# Patient Record
Sex: MALE | Race: WHITE | NOT HISPANIC OR LATINO | Employment: OTHER | URBAN - METROPOLITAN AREA
[De-identification: names, ages, dates, MRNs, and addresses within clinical notes are randomized per-mention and may not be internally consistent; named-entity substitution may affect disease eponyms.]

---

## 2019-04-15 ENCOUNTER — OFFICE VISIT (OUTPATIENT)
Dept: CARDIOLOGY | Facility: CLINIC | Age: 79
End: 2019-04-15
Payer: MEDICARE

## 2019-04-15 VITALS
HEIGHT: 74 IN | BODY MASS INDEX: 27.08 KG/M2 | WEIGHT: 211 LBS | DIASTOLIC BLOOD PRESSURE: 55 MMHG | SYSTOLIC BLOOD PRESSURE: 110 MMHG | HEART RATE: 40 BPM

## 2019-04-15 DIAGNOSIS — I10 ESSENTIAL HYPERTENSION, BENIGN: ICD-10-CM

## 2019-04-15 DIAGNOSIS — I25.10 ASCVD (ARTERIOSCLEROTIC CARDIOVASCULAR DISEASE): Primary | ICD-10-CM

## 2019-04-15 DIAGNOSIS — Z95.5 S/P CORONARY ARTERY STENT PLACEMENT: ICD-10-CM

## 2019-04-15 DIAGNOSIS — E78.5 HYPERLIPIDEMIA LDL GOAL <70: ICD-10-CM

## 2019-04-15 PROCEDURE — 99204 OFFICE O/P NEW MOD 45 MIN: CPT | Performed by: INTERNAL MEDICINE

## 2019-04-15 PROCEDURE — 93000 ELECTROCARDIOGRAM COMPLETE: CPT | Performed by: INTERNAL MEDICINE

## 2019-04-15 RX ORDER — AMLODIPINE BESYLATE 5 MG/1
5 TABLET ORAL DAILY
COMMUNITY
Start: 2019-01-07

## 2019-04-15 RX ORDER — MAGNESIUM OXIDE 400 MG/1
400 TABLET ORAL DAILY
COMMUNITY
Start: 2018-10-08

## 2019-04-15 RX ORDER — ASPIRIN 81 MG/1
81 TABLET, CHEWABLE ORAL DAILY
COMMUNITY
Start: 2018-10-08

## 2019-04-15 RX ORDER — CARVEDILOL 3.12 MG/1
3.12 TABLET ORAL 2 TIMES DAILY
COMMUNITY
Start: 2018-10-08

## 2019-04-15 RX ORDER — ATORVASTATIN CALCIUM 10 MG/1
10 TABLET, FILM COATED ORAL DAILY
COMMUNITY

## 2019-04-15 RX ORDER — LOSARTAN POTASSIUM 50 MG/1
50 TABLET ORAL DAILY
COMMUNITY

## 2019-04-15 ASSESSMENT — MIFFLIN-ST. JEOR: SCORE: 1756.84

## 2019-04-15 NOTE — PROGRESS NOTES
HPI and Plan:   This nice 78-year-old gentleman is here, along with his wife, to establish cardiovascular follow-up.  About 7 years ago he moved to [place] and is now moving back here.  Prior to that in 2009 he had an acute coronary syndrome and had stenting of a complex LAD lesion with a drug-eluting stent.    He had done well after that but in 2015 in [place] started having really bad heartburn and eventually presented to the hospital where he states it was a total occlusion of 1 of his arteries that was successfully stented and did not not have a significant heart attack.  Those symptoms were different than his symptoms in 2009 and I wonder if that was a right coronary lesion.  He is going to get that cath report so that we can see it.    Since 2015 he has not had further symptoms.  He generally exercises pretty regularly and has had no limitations with that.  Since he just is moving back here he has been exercising less but is in the gym 2 or 3 days a week right now.  He is not having any exercise related limitations or symptoms.  Denies chest discomfort, exertional heartburn, palpitations dizziness or syncope, orthopnea PND or edema.  Gets some mild edema towards the end of the day which goes away at night, this has not changed and is not new.  Denies myalgias or muscle weakness, focal neurologic symptoms, claudication.      I reviewed recent outside labs in care everywhere in February his lipids were under appropriate levels with LDL 59, total cholesterol 114, triglycerides 101.  Electrolytes are normal.  Creatinine was elevated at 1.32 which is up from typically around 1.0 in 2012 here.  Hemoglobin was 13.5, hemoglobin A1c elevated at 7%.      Data review indicates a fairly good Mediterranean type diet and I reviewed the details of that with him again today.  He was able to lose quite a bit of weight up until last year and that was more sedentary with all of his moves recently and is gained some  weight back but he is still 10 pounds or more down from 2012.  He would like to get back down to 190-195 where he was previously and states he will go back to that regimen.    Exam detailed below.  Blood pressure controlled.  Heart rate 60.  Cardiac, pulmonary, peripheral vascular exams normal.    EKG today demonstrates sinus rhythm with occasional PACs.  There are no Q waves and it is otherwise within normal limits.    Impression/plan    1-coronary artery disease.  Currently without ischemic heart failure or arrhythmia symptoms.  Risk factor intervention going fairly well.  Ramping back up on exercise and weight loss will help and I think he will be able to do this.  That may help his diabetes control also.    2-history of drug-eluting stents.  He will try to get the 2015 catheter report for us so we can do further details.    3.-Dyslipidemia, at goal.    4-hypertension controlled.    It was nice to see this gentleman doing well.  At this point in time his medication regimen is appropriate and should be continued.  Lifestyle intervention ramp up recommended.  Otherwise as long as he remains stable I recommended follow-up in 1 year.      Orders Placed This Encounter   Procedures     Follow-Up with Cardiac Advanced Practice Provider     EKG 12-lead complete w/read - Clinics (performed today)       Orders Placed This Encounter   Medications     atorvastatin (LIPITOR) 10 MG tablet     Sig: Take 10 mg by mouth daily     losartan (COZAAR) 50 MG tablet     Sig: Take 50 mg by mouth daily     carvedilol (COREG) 3.125 MG tablet     Sig: Take 3.125 mg by mouth 2 times daily     magnesium oxide (MAG-OX) 400 MG tablet     Sig: Take 400 mg by mouth daily     aspirin (ASA) 81 MG chewable tablet     Sig: Take 81 mg by mouth daily     insulin glargine U-300 (TOUJEO MAX SOLOSTAR) 300 UNIT/ML injection     Sig: Inject 24 mg Subcutaneous At Bedtime     amLODIPine (NORVASC) 5 MG tablet     Sig: Take 5 mg by mouth daily        Medications Discontinued During This Encounter   Medication Reason     simvastatin (ZOCOR) 40 MG tablet Medication Reconciliation Clean Up     losartan (COZAAR) 100 MG tablet Medication Reconciliation Clean Up     ASPIRIN 325 MG OR TBEC Medication Reconciliation Clean Up     doxycycline (VIBRA-TABS) 100 MG tablet Medication Reconciliation Clean Up     FLUoxetine (PROZAC) 20 MG capsule Medication Reconciliation Clean Up     glyBURIDE (DIABETA / MICRONASE) 5 MG tablet Medication Reconciliation Clean Up     hydrochlorothiazide (HYDRODIURIL) 25 MG tablet Medication Reconciliation Clean Up     LANTUS VIAL 100 UNITS/ML SC SOLN Medication Reconciliation Clean Up     sitagliptan (JANUVIA) 100 MG tablet Discontinued by another Health Care Provider     VITAMIN B-12 50 MCG OR TABS Medication Reconciliation Clean Up         Encounter Diagnoses   Name Primary?     ASCVD (arteriosclerotic cardiovascular disease) Yes     Hyperlipidemia LDL goal <70      S/P coronary artery stent placement      Essential hypertension, benign        CURRENT MEDICATIONS:  Current Outpatient Medications   Medication Sig Dispense Refill     amLODIPine (NORVASC) 5 MG tablet Take 5 mg by mouth daily       aspirin (ASA) 81 MG chewable tablet Take 81 mg by mouth daily       atorvastatin (LIPITOR) 10 MG tablet Take 10 mg by mouth daily       carvedilol (COREG) 3.125 MG tablet Take 3.125 mg by mouth 2 times daily       insulin glargine U-300 (TOUJEO MAX SOLOSTAR) 300 UNIT/ML injection Inject 24 mg Subcutaneous At Bedtime       losartan (COZAAR) 50 MG tablet Take 50 mg by mouth daily       magnesium oxide (MAG-OX) 400 MG tablet Take 400 mg by mouth daily       metFORMIN (GLUCOPHAGE) 1000 MG tablet Take 1 tablet by mouth 2 times daily (with meals). 180 tablet 0     BD ULTRA FINE PEN NEEDLES 31g 5 mm using injecting once per day 100 Units 1     glucose blood VI test strips (ONE TOUCH ULTRA TEST) strip by In Vitro route. 100 strip 4     insulin detemir  (LEVEMIR FLEXPEN) 100 UNIT/ML injection Inject 40 Units Subcutaneous daily. (Patient not taking: Reported on 4/15/2019) 3 Month 0     INSULIN PEN NEEDLE MISC Insulin pen needle caps- in injection daily 100 Each 3     nitroGLYCERIN (NITROSTAT) 0.4 MG SL tablet Take 1 tablet by mouth every 5 minutes as needed. up to 3 tablets per episode. (Patient not taking: Reported on 4/15/2019) 90 tablet 0       ALLERGIES     Allergies   Allergen Reactions     Lisinopril Swelling     angioedema       PAST MEDICAL HISTORY:  Past Medical History:   Diagnosis Date     ASCVD (arteriosclerotic cardiovascular disease) 5/09    NSTMI; PTCA of LAD     Degeneration of lumbar or lumbosacral intervertebral disc      Depression 2000     Essential hypertension, benign      Gout      Other and unspecified hyperlipidemia      Thoracic or lumbosacral neuritis or radiculitis, unspecified      Type II or unspecified type diabetes mellitus without mention of complication, not stated as uncontrolled 1997       PAST SURGICAL HISTORY:  Past Surgical History:   Procedure Laterality Date     ANGIOPLASTY  5/11/09    PTCA of LAD at [facility]     APPENDECTOMY  2003    abscessed appendix     C INJECT EPIDURAL ANEST,LUMBAR,SINGLE  7/27/07    Right L5-S1 translaminar epidural under fluoroscopy with corticosteroid     C LUMBAR SPINE FUSN,POST TECH  2/19/08    Bilateral L4-5 decompression, Posterolateral fusion L4-5 with pedicular fixation and local autograft     HC COLONOSCOPY THRU STOMA, DIAGNOSTIC  2002    no polyps, no diverticuli     HC COLONOSCOPY THRU STOMA, DIAGNOSTIC  11/02/07    Normal colonoscopy at [facility]     HC REPAIR ING HERNIA,5+Y/O,REDUCIBL  1960's    bilateral inguinal hernia repair     LAPAROSCOPIC HERNIORRHAPHY VENTRAL  11/12/10    Laparoscopic ventral hernia repair with Proceed patch       FAMILY HISTORY:  Family History   Problem Relation Age of Onset     C.A.D. Father      Diabetes Father      Family History Negative Mother      Blood Disease  Sister          acute leukemia age 56     Family History Negative Sister      Family History Negative Sister        SOCIAL HISTORY:  Social History     Socioeconomic History     Marital status:      Spouse name: None     Number of children: None     Years of education: None     Highest education level: None   Occupational History     None   Social Needs     Financial resource strain: None     Food insecurity:     Worry: None     Inability: None     Transportation needs:     Medical: None     Non-medical: None   Tobacco Use     Smoking status: Former Smoker     Types: Cigars     Smokeless tobacco: Former User     Quit date: 2012     Tobacco comment: former 3 cigars per week (socially)   Substance and Sexual Activity     Alcohol use: Yes     Comment: occasionally     Drug use: No     Sexual activity: Yes     Partners: Female   Lifestyle     Physical activity:     Days per week: None     Minutes per session: None     Stress: None   Relationships     Social connections:     Talks on phone: None     Gets together: None     Attends Nond[employer] service: None     Active member of club or organization: None     Attends meetings of clubs or organizations: None     Relationship status: None     Intimate partner violence:     Fear of current or ex partner: None     Emotionally abused: None     Physically abused: None     Forced sexual activity: None   Other Topics Concern     Parent/sibling w/ CABG, MI or angioplasty before 65F 55M? Not Asked   Social History Narrative     None       Review of Systems:  Skin:  Positive for bruising had melanomas removed   Eyes:  Negative      ENT:  Negative      Respiratory:  Negative       Cardiovascular:    edema;Positive for;fatigue edema in feet  Gastroenterology: Negative      Genitourinary:  not assessed      Musculoskeletal:  Positive for joint pain occ  Neurologic:  Negative      Psychiatric:  Positive for sleep disturbances    Heme/Lymph/Imm:  Positive for allergies   "  Endocrine:  Positive for diabetes      Physical Exam:  Vitals: /55   Pulse (!) 40   Ht 1.88 m (6' 2\")   Wt 95.7 kg (211 lb)   BMI 27.09 kg/m      Constitutional:  cooperative, alert and oriented, well developed, well nourished, in no acute distress        Skin:  warm and dry to the touch, no apparent skin lesions or masses noted          Head:  normocephalic, no masses or lesions        Eyes:  pupils equal and round;sclera white;EOMS intact        Lymph:      ENT:  no pallor or cyanosis, dentition good        Neck:  carotid pulses are full and equal bilaterally, JVP normal, no carotid bruit        Respiratory:  normal breath sounds, clear to auscultation, normal A-P diameter, normal symmetry, normal respiratory excursion, no use of accessory muscles         Cardiac: regular rhythm, normal S1/S2, no S3 or S4, apical impulse not displaced, no murmurs, gallops or rubs                pulses full and equal, no bruits auscultated                                        GI:  abdomen soft;no HSM;non-tender;no bruits        Extremities and Muscular Skeletal:  no deformities, clubbing, cyanosis, erythema observed       LLE edema;trace      Neurological:  no gross motor deficits        Psych:  affect appropriate, oriented to time, person and place        CC  Mehrdad Cortez MD  [address] [place] [address]              "

## 2019-04-15 NOTE — LETTER
4/15/2019    Bon Su MD  [facility] [address]  [facility] [address]    RE: Hilario Herrera       Dear Colleague,    I had the pleasure of seeing Hilario Herrera in the [facility].    HPI and Plan:   This nice 78-year-old gentleman is here, along with his wife, to establish cardiovascular follow-up.  About 7 years ago he moved to [place] and is now moving back here.  Prior to that in 2009 he had an acute coronary syndrome and had stenting of a complex LAD lesion with a drug-eluting stent.    He had done well after that but in 2015 in [place] started having really bad heartburn and eventually presented to the hospital where he states it was a total occlusion of 1 of his arteries that was successfully stented and did not not have a significant heart attack.  Those symptoms were different than his symptoms in 2009 and I wonder if that was a right coronary lesion.  He is going to get that cath report so that we can see it.    Since 2015 he has not had further symptoms.  He generally exercises pretty regularly and has had no limitations with that.  Since he just is moving back here he has been exercising less but is in the gym 2 or 3 days a week right now.  He is not having any exercise related limitations or symptoms.  Denies chest discomfort, exertional heartburn, palpitations dizziness or syncope, orthopnea PND or edema.  Gets some mild edema towards the end of the day which goes away at night, this has not changed and is not new.  Denies myalgias or muscle weakness, focal neurologic symptoms, claudication.      I reviewed recent outside labs in care everywhere in February his lipids were under appropriate levels with LDL 59, total cholesterol 114, triglycerides 101.  Electrolytes are normal.  Creatinine was elevated at 1.32 which is up from typically around 1.0 in 2012 here.  Hemoglobin was 13.5, hemoglobin A1c elevated at 7%.      Data review  indicates a fairly good Mediterranean type diet and I reviewed the details of that with him again today.  He was able to lose quite a bit of weight up until last year and that was more sedentary with all of his moves recently and is gained some weight back but he is still 10 pounds or more down from 2012.  He would like to get back down to 190-195 where he was previously and states he will go back to that regimen.    Exam detailed below.  Blood pressure controlled.  Heart rate 60.  Cardiac, pulmonary, peripheral vascular exams normal.    EKG today demonstrates sinus rhythm with occasional PACs.  There are no Q waves and it is otherwise within normal limits.    Impression/plan    1-coronary artery disease.  Currently without ischemic heart failure or arrhythmia symptoms.  Risk factor intervention going fairly well.  Ramping back up on exercise and weight loss will help and I think he will be able to do this.  That may help his diabetes control also.    2-history of drug-eluting stents.  He will try to get the 2015 catheter report for us so we can do further details.    3.-Dyslipidemia, at goal.    4-hypertension controlled.    It was nice to see this gentleman doing well.  At this point in time his medication regimen is appropriate and should be continued.  Lifestyle intervention ramp up recommended.  Otherwise as long as he remains stable I recommended follow-up in 1 year.      Orders Placed This Encounter   Procedures     Follow-Up with Cardiac Advanced Practice Provider     EKG 12-lead complete w/read - Clinics (performed today)       Orders Placed This Encounter   Medications     atorvastatin (LIPITOR) 10 MG tablet     Sig: Take 10 mg by mouth daily     losartan (COZAAR) 50 MG tablet     Sig: Take 50 mg by mouth daily     carvedilol (COREG) 3.125 MG tablet     Sig: Take 3.125 mg by mouth 2 times daily     magnesium oxide (MAG-OX) 400 MG tablet     Sig: Take 400 mg by mouth daily     aspirin (ASA) 81 MG chewable  tablet     Sig: Take 81 mg by mouth daily     insulin glargine U-300 (TOUJEO MAX SOLOSTAR) 300 UNIT/ML injection     Sig: Inject 24 mg Subcutaneous At Bedtime     amLODIPine (NORVASC) 5 MG tablet     Sig: Take 5 mg by mouth daily       Medications Discontinued During This Encounter   Medication Reason     simvastatin (ZOCOR) 40 MG tablet Medication Reconciliation Clean Up     losartan (COZAAR) 100 MG tablet Medication Reconciliation Clean Up     ASPIRIN 325 MG OR TBEC Medication Reconciliation Clean Up     doxycycline (VIBRA-TABS) 100 MG tablet Medication Reconciliation Clean Up     FLUoxetine (PROZAC) 20 MG capsule Medication Reconciliation Clean Up     glyBURIDE (DIABETA / MICRONASE) 5 MG tablet Medication Reconciliation Clean Up     hydrochlorothiazide (HYDRODIURIL) 25 MG tablet Medication Reconciliation Clean Up     LANTUS VIAL 100 UNITS/ML SC SOLN Medication Reconciliation Clean Up     sitagliptan (JANUVIA) 100 MG tablet Discontinued by another Health Care Provider     VITAMIN B-12 50 MCG OR TABS Medication Reconciliation Clean Up         Encounter Diagnoses   Name Primary?     ASCVD (arteriosclerotic cardiovascular disease) Yes     Hyperlipidemia LDL goal <70      S/P coronary artery stent placement      Essential hypertension, benign        CURRENT MEDICATIONS:  Current Outpatient Medications   Medication Sig Dispense Refill     amLODIPine (NORVASC) 5 MG tablet Take 5 mg by mouth daily       aspirin (ASA) 81 MG chewable tablet Take 81 mg by mouth daily       atorvastatin (LIPITOR) 10 MG tablet Take 10 mg by mouth daily       carvedilol (COREG) 3.125 MG tablet Take 3.125 mg by mouth 2 times daily       insulin glargine U-300 (TOUJEO MAX SOLOSTAR) 300 UNIT/ML injection Inject 24 mg Subcutaneous At Bedtime       losartan (COZAAR) 50 MG tablet Take 50 mg by mouth daily       magnesium oxide (MAG-OX) 400 MG tablet Take 400 mg by mouth daily       metFORMIN (GLUCOPHAGE) 1000 MG tablet Take 1 tablet by mouth 2 times  daily (with meals). 180 tablet 0     BD ULTRA FINE PEN NEEDLES 31g 5 mm using injecting once per day 100 Units 1     glucose blood VI test strips (ONE TOUCH ULTRA TEST) strip by In Vitro route. 100 strip 4     insulin detemir (LEVEMIR FLEXPEN) 100 UNIT/ML injection Inject 40 Units Subcutaneous daily. (Patient not taking: Reported on 4/15/2019) 3 Month 0     INSULIN PEN NEEDLE MISC Insulin pen needle caps- in injection daily 100 Each 3     nitroGLYCERIN (NITROSTAT) 0.4 MG SL tablet Take 1 tablet by mouth every 5 minutes as needed. up to 3 tablets per episode. (Patient not taking: Reported on 4/15/2019) 90 tablet 0       ALLERGIES     Allergies   Allergen Reactions     Lisinopril Swelling     angioedema       PAST MEDICAL HISTORY:  Past Medical History:   Diagnosis Date     ASCVD (arteriosclerotic cardiovascular disease) 5/09    NSTMI; PTCA of LAD     Degeneration of lumbar or lumbosacral intervertebral disc      Depression 2000     Essential hypertension, benign      Gout      Other and unspecified hyperlipidemia      Thoracic or lumbosacral neuritis or radiculitis, unspecified      Type II or unspecified type diabetes mellitus without mention of complication, not stated as uncontrolled 1997       PAST SURGICAL HISTORY:  Past Surgical History:   Procedure Laterality Date     ANGIOPLASTY  5/11/09    PTCA of LAD at [facility]     APPENDECTOMY  2003    abscessed appendix     C INJECT EPIDURAL ANEST,LUMBAR,SINGLE  7/27/07    Right L5-S1 translaminar epidural under fluoroscopy with corticosteroid     C LUMBAR SPINE FUSN,POST TECH  2/19/08    Bilateral L4-5 decompression, Posterolateral fusion L4-5 with pedicular fixation and local autograft     HC COLONOSCOPY THRU STOMA, DIAGNOSTIC  2002    no polyps, no diverticuli     HC COLONOSCOPY THRU STOMA, DIAGNOSTIC  11/02/07    Normal colonoscopy at [facility]     HC REPAIR ING HERNIA,5+Y/O,REDUCIBL  1960's    bilateral inguinal hernia repair     LAPAROSCOPIC HERNIORRHAPHY VENTRAL  11/12/10     Laparoscopic ventral hernia repair with Proceed patch       FAMILY HISTORY:  Family History   Problem Relation Age of Onset     C.A.D. Father      Diabetes Father      Family History Negative Mother      Blood Disease Sister          acute leukemia age 56     Family History Negative Sister      Family History Negative Sister        SOCIAL HISTORY:  Social History     Socioeconomic History     Marital status:      Spouse name: None     Number of children: None     Years of education: None     Highest education level: None   Occupational History     None   Social Needs     Financial resource strain: None     Food insecurity:     Worry: None     Inability: None     Transportation needs:     Medical: None     Non-medical: None   Tobacco Use     Smoking status: Former Smoker     Types: Cigars     Smokeless tobacco: Former User     Quit date: 2012     Tobacco comment: former 3 cigars per week (socially)   Substance and Sexual Activity     Alcohol use: Yes     Comment: occasionally     Drug use: No     Sexual activity: Yes     Partners: Female   Lifestyle     Physical activity:     Days per week: None     Minutes per session: None     Stress: None   Relationships     Social connections:     Talks on phone: None     Gets together: None     Attends [employer] service: None     Active member of club or organization: None     Attends meetings of clubs or organizations: None     Relationship status: None     Intimate partner violence:     Fear of current or ex partner: None     Emotionally abused: None     Physically abused: None     Forced sexual activity: None   Other Topics Concern     Parent/sibling w/ CABG, MI or angioplasty before 65F 55M? Not Asked   Social History Narrative     None       Review of Systems:  Skin:  Positive for bruising had melanomas removed   Eyes:  Negative      ENT:  Negative      Respiratory:  Negative       Cardiovascular:    edema;Positive for;fatigue edema in  "feet  Gastroenterology: Negative      Genitourinary:  not assessed      Musculoskeletal:  Positive for joint pain occ  Neurologic:  Negative      Psychiatric:  Positive for sleep disturbances    Heme/Lymph/Imm:  Positive for allergies    Endocrine:  Positive for diabetes      Physical Exam:  Vitals: /55   Pulse (!) 40   Ht 1.88 m (6' 2\")   Wt 95.7 kg (211 lb)   BMI 27.09 kg/m       Constitutional:  cooperative, alert and oriented, well developed, well nourished, in no acute distress        Skin:  warm and dry to the touch, no apparent skin lesions or masses noted          Head:  normocephalic, no masses or lesions        Eyes:  pupils equal and round;sclera white;EOMS intact        Lymph:      ENT:  no pallor or cyanosis, dentition good        Neck:  carotid pulses are full and equal bilaterally, JVP normal, no carotid bruit        Respiratory:  normal breath sounds, clear to auscultation, normal A-P diameter, normal symmetry, normal respiratory excursion, no use of accessory muscles         Cardiac: regular rhythm, normal S1/S2, no S3 or S4, apical impulse not displaced, no murmurs, gallops or rubs                pulses full and equal, no bruits auscultated                                        GI:  abdomen soft;no HSM;non-tender;no bruits        Extremities and Muscular Skeletal:  no deformities, clubbing, cyanosis, erythema observed       LLE edema;trace      Neurological:  no gross motor deficits        Psych:  affect appropriate, oriented to time, person and place          Thank you for allowing me to participate in the care of your patient.    Sincerely,     Solomon Wright MD     [facility]    "

## 2019-05-12 NOTE — PROGRESS NOTES
Name: Hilario Herrera is a 78 year old man, self-referred for evaluation of diabetes mellitus.    Chief Complaint   Patient presents with     Diabetes       HPI:  Recent issues:  Here for evaluation of diabetes.  Reviewed medical history from patient and Epic chart record        Diagnosis of diabetes mellitus in his 50's  Recalls high glucose levels during medical evaluation with Dr. Alexander Cortez/[facility]  Initial treatment with metformin medication  Has also use glyburide and Lantus insulin medications in the past  Had seen me for diabetes evaluations years ago  2012- 2018. Moved to [place], [place]  Recalls participating with diabetes study while in [place]   Tried a different type of insulin   Recalls having a hgbA1c near 5.8%  Has continued on metformin and Toujeo insulin medications    Previous [facility] labs include:      Previous [facility] hgbA1c levels include:  Recent Labs   Lab Test 04/11/12  0929 10/21/11  1100 04/23/11  1940 03/04/11  0731   A1C 8.7* 8.3* 7.7* 7.6*     Current DM medications:   Metformin 1000 mg BID   Toujeo insulin 20 units at bedtime    Using a BG meter (name?)    Testing 2x/day   Recent trends am  and - 248    Exercises with step class or weights at [facility] 3x/week, also some golf  Recent [facility] labs include:  Lab Results   Component Value Date    A1C 8.7 (H) 04/11/2012     04/11/2012    POTASSIUM 4.1 04/11/2012    CHLORIDE 105 04/11/2012    CO2 32 04/11/2012    ANIONGAP 6 04/11/2012     (H) 04/11/2012    BUN 15 04/11/2012    CR 1.07 04/11/2012    GFRESTIMATED 69 04/11/2012    GFRESTBLACK 83 04/11/2012    SISI 8.8 04/11/2012    CHOL 123 04/11/2012    TRIG 143 04/11/2012    HDL 35 (L) 04/11/2012    LDL 60 04/11/2012    UCRR 56 06/18/2010    MICROL 3 06/18/2010    UMALCR 5.36 06/18/2010    TSH 1.89 03/04/2011     Last eye exam 1/2019, no DR  DM Complications:   Macrovascular disease    Recalls having MI, though details not available    CAD, had  2-coronary stents in  and also 2015  Recent symptoms:   Rhinorrhea and mild cough, mild fatigue, achiness (after playing golf)   Denies chest pain, back pain, or GI symptoms  Other chronic illnesses include:   Hypertension:   Takes amlodipine, Coreg, losartan meds, followed by PCP   CAD:     Takes Coreg and NTG prn meds, followed by PCP   Hyperlipidemia: Takes atorvastatin med, followed by PCP         , lives in [place]  Sees Dr. Bon Su/[facility] for general medicine evaluations.    PMH/PSH:  Past Medical History:   Diagnosis Date     ASCVD (arteriosclerotic cardiovascular disease)     NSTMI; PTCA of LAD     Degeneration of lumbar or lumbosacral intervertebral disc      Depression      Essential hypertension, benign      Gout      Other and unspecified hyperlipidemia      Thoracic or lumbosacral neuritis or radiculitis, unspecified      Type II or unspecified type diabetes mellitus without mention of complication, not stated as uncontrolled      Past Surgical History:   Procedure Laterality Date     ANGIOPLASTY  09    PTCA of LAD at [facility]     APPENDECTOMY      abscessed appendix     C INJECT EPIDURAL ANEST,LUMBAR,SINGLE  07    Right L5-S1 translaminar epidural under fluoroscopy with corticosteroid     C LUMBAR SPINE FUSN,POST TECH  08    Bilateral L4-5 decompression, Posterolateral fusion L4-5 with pedicular fixation and local autograft     HC COLONOSCOPY THRU STOMA, DIAGNOSTIC      no polyps, no diverticuli     HC COLONOSCOPY THRU STOMA, DIAGNOSTIC  07    Normal colonoscopy at [facility]     HC REPAIR ING HERNIA,5+Y/O,REDUCIBL  1960's    bilateral inguinal hernia repair     LAPAROSCOPIC HERNIORRHAPHY VENTRAL  11/12/10    Laparoscopic ventral hernia repair with Proceed patch       Family Hx:  Family History   Problem Relation Age of Onset     C.A.D. Father      Diabetes Father      Family History Negative Mother      Blood Disease Sister           acute leukemia age 56     Family History Negative Sister      Family History Negative Sister          Social Hx:  Social History     Socioeconomic History     Marital status:      Spouse name: Not on file     Number of children: Not on file     Years of education: Not on file     Highest education level: Not on file   Occupational History     Not on file   Social Needs     Financial resource strain: Not on file     Food insecurity:     Worry: Not on file     Inability: Not on file     Transportation needs:     Medical: Not on file     Non-medical: Not on file   Tobacco Use     Smoking status: Former Smoker     Types: Cigars     Smokeless tobacco: Former User     Quit date: 4/13/2012     Tobacco comment: former 3 cigars per week (socially)   Substance and Sexual Activity     Alcohol use: Yes     Comment: occasionally     Drug use: No     Sexual activity: Yes     Partners: Female   Lifestyle     Physical activity:     Days per week: Not on file     Minutes per session: Not on file     Stress: Not on file   Relationships     Social connections:     Talks on phone: Not on file     Gets together: Not on file     Attends Rastafari service: Not on file     Active member of club or organization: Not on file     Attends meetings of clubs or organizations: Not on file     Relationship status: Not on file     Intimate partner violence:     Fear of current or ex partner: Not on file     Emotionally abused: Not on file     Physically abused: Not on file     Forced sexual activity: Not on file   Other Topics Concern     Parent/sibling w/ CABG, MI or angioplasty before 65F 55M? Not Asked   Social History Narrative     Not on file          MEDICATIONS:  has a current medication list which includes the following prescription(s): amlodipine, aspirin, atorvastatin, BD ULTRA FINE PEN NEEDLES, carvedilol, blood glucose, insulin detemir, insulin glargine u-300, insulin pen needle, losartan, magnesium oxide, metformin, and  "nitroglycerin.    ROS:     ROS: 10 point ROS neg other than the symptoms noted above in the HPI.    GENERAL: mild fatigue, previous wt loss and recent wt stable; denies fevers, chills, night sweats.   HEENT: nasal symptoms as noted; no dysphagia, odonophagia, diplopia, neck pain  THYROID:  no apparent hyper or hypothyroid symptoms  CV: no chest pain, pressure, palpitations  LUNGS: no SOB, SINGH, cough, wheezing   ABDOMEN: no diarrhea, constipation, abdominal pain  EXTREMITIES: no rashes, ulcers, edema  NEUROLOGY: no headaches, denies changes in vision, tingling, extremitiy numbness   MSK: some achniess; denies muscle weakness  SKIN: no rashes or lesions  : occasional nocturia  PSYCH:  stable mood, no significant anxiety or depression  ENDOCRINE: no heat or cold intolerance    Physical Exam   VS: /67   Pulse 52   Ht 1.88 m (6' 2\")   Wt 95.2 kg (209 lb 14.4 oz)   BMI 26.95 kg/m    GENERAL: AXOX3, NAD, well dressed, answering questions appropriately, appears stated age.  THYROID:  normal gland, no apparent nodules or goiter  HEENT: neck non-tender, no exopthalmous, no proptosis, EOMI  CV: RRR, no rubs, gallops, no murmurs  LUNGS: CTAB, no wheezes, rales, or ronchi  ABDOMEN: soft, nontender, nondistended  EXTREMITIES: mild ankle edema; +pedal pulses, no lesions  NEUROLOGY: CN grossly intact, no tremors  MSK: grossly intact  SKIN: no rashes, no lesions    LABS:    All pertinent notes, labs, and images personally reviewed by me.     A/P:  Encounter Diagnosis   Name Primary?     Type 2 diabetes mellitus with other circulatory complication, with long-term current use of insulin (H) Yes       Comments:  Reviewed health history and diabetes issues.  Recent glucose control good    Plan:  Discussed general issues with the diabetes diagnosis and management  We discussed the hgbA1c test which reflects previous overall glucose levels or control  Discussed the importance of blood glucose (BG) testing to assess glucose " trends  Provided general overview of the diabetes medication options and medication treatment plan.    Recommend:  Continue current metformin and Toujeo insulin medications at this time  Consider changing from basal insulin to a GLP1RA medication such as Victoza, Ozempic, or Trulicity   Check with [facility] physician to learn if we can prescribe VA-approved meds  Goal target FBG  mg/dl  Check labs today   Continue current hypertension and atorvastatin medications  Keep focus on diet, exercise, weight management.  Advise having fasting lipid panel testing and dilated eye examination, at least annually    See back for f/u evaluation in 2-3 weeks  Addressed patient questions today    Labs ordered today:   Orders Placed This Encounter   Procedures     Hemoglobin A1c     Basic metabolic panel     Albumin Random Urine Quantitative with Creat Ratio     Radiology/Consults ordered today: None    More than 50% of the time spent with Mr. Herrera on counseling / coordinating his care.  Total appointment time was 50 minutes.    Follow-up:  2-3 weeks    Adriano Carvalho MD  Endocrinology  [facility]  CC: Bon Su

## 2019-05-13 ENCOUNTER — OFFICE VISIT (OUTPATIENT)
Dept: ENDOCRINOLOGY | Facility: CLINIC | Age: 79
End: 2019-05-13
Payer: MEDICARE

## 2019-05-13 VITALS
HEIGHT: 74 IN | HEART RATE: 52 BPM | SYSTOLIC BLOOD PRESSURE: 125 MMHG | DIASTOLIC BLOOD PRESSURE: 67 MMHG | WEIGHT: 209.9 LBS | BODY MASS INDEX: 26.94 KG/M2

## 2019-05-13 DIAGNOSIS — E11.59 TYPE 2 DIABETES MELLITUS WITH OTHER CIRCULATORY COMPLICATION, WITH LONG-TERM CURRENT USE OF INSULIN (H): Primary | ICD-10-CM

## 2019-05-13 DIAGNOSIS — Z79.4 TYPE 2 DIABETES MELLITUS WITH OTHER CIRCULATORY COMPLICATION, WITH LONG-TERM CURRENT USE OF INSULIN (H): Primary | ICD-10-CM

## 2019-05-13 LAB — HBA1C MFR BLD: 6.9 % (ref 0–5.6)

## 2019-05-13 PROCEDURE — 80048 BASIC METABOLIC PNL TOTAL CA: CPT | Performed by: INTERNAL MEDICINE

## 2019-05-13 PROCEDURE — 36415 COLL VENOUS BLD VENIPUNCTURE: CPT | Performed by: INTERNAL MEDICINE

## 2019-05-13 PROCEDURE — 83036 HEMOGLOBIN GLYCOSYLATED A1C: CPT | Performed by: INTERNAL MEDICINE

## 2019-05-13 PROCEDURE — 99204 OFFICE O/P NEW MOD 45 MIN: CPT | Performed by: INTERNAL MEDICINE

## 2019-05-13 PROCEDURE — 82043 UR ALBUMIN QUANTITATIVE: CPT | Performed by: INTERNAL MEDICINE

## 2019-05-13 ASSESSMENT — MIFFLIN-ST. JEOR: SCORE: 1751.85

## 2019-05-14 LAB
ANION GAP SERPL CALCULATED.3IONS-SCNC: 2 MMOL/L (ref 3–14)
BUN SERPL-MCNC: 20 MG/DL (ref 7–30)
CALCIUM SERPL-MCNC: 8.9 MG/DL (ref 8.5–10.1)
CHLORIDE SERPL-SCNC: 111 MMOL/L (ref 94–109)
CO2 SERPL-SCNC: 27 MMOL/L (ref 20–32)
CREAT SERPL-MCNC: 1.27 MG/DL (ref 0.66–1.25)
CREAT UR-MCNC: 227 MG/DL
GFR SERPL CREATININE-BSD FRML MDRD: 54 ML/MIN/{1.73_M2}
GLUCOSE SERPL-MCNC: 119 MG/DL (ref 70–99)
MICROALBUMIN UR-MCNC: 25 MG/L
MICROALBUMIN/CREAT UR: 10.93 MG/G CR (ref 0–17)
POTASSIUM SERPL-SCNC: 4.2 MMOL/L (ref 3.4–5.3)
SODIUM SERPL-SCNC: 140 MMOL/L (ref 133–144)

## 2019-06-04 ENCOUNTER — OFFICE VISIT (OUTPATIENT)
Dept: ENDOCRINOLOGY | Facility: CLINIC | Age: 79
End: 2019-06-04
Payer: MEDICARE

## 2019-06-04 VITALS
HEIGHT: 74 IN | DIASTOLIC BLOOD PRESSURE: 72 MMHG | HEART RATE: 49 BPM | WEIGHT: 209.2 LBS | BODY MASS INDEX: 26.85 KG/M2 | SYSTOLIC BLOOD PRESSURE: 134 MMHG

## 2019-06-04 DIAGNOSIS — Z79.4 TYPE 2 DIABETES MELLITUS WITH OTHER CIRCULATORY COMPLICATION, WITH LONG-TERM CURRENT USE OF INSULIN (H): Primary | ICD-10-CM

## 2019-06-04 DIAGNOSIS — E11.59 TYPE 2 DIABETES MELLITUS WITH OTHER CIRCULATORY COMPLICATION, WITH LONG-TERM CURRENT USE OF INSULIN (H): Primary | ICD-10-CM

## 2019-06-04 PROCEDURE — 99213 OFFICE O/P EST LOW 20 MIN: CPT | Performed by: INTERNAL MEDICINE

## 2019-06-04 RX ORDER — MULTIVIT-MIN/IRON/FOLIC ACID/K 18-600-40
CAPSULE ORAL
COMMUNITY
End: 2020-01-06

## 2019-06-04 ASSESSMENT — MIFFLIN-ST. JEOR: SCORE: 1748.67

## 2019-06-04 NOTE — PATIENT INSTRUCTIONS
Continue current metformin and Toujeo med use   Non-insulin injection options:    Victoza daily injection    Ozempic or Trulicity weekly injection medication     Check with [facility] physician to learn if we can prescribe VA-approved meds  Goal target fasting glucose  mg/dl  Contact Dr. Carvalho when [facility] Rx information available

## 2019-06-04 NOTE — PROGRESS NOTES
Name: Hilario Herrera      Chief Complaint   Patient presents with     Diabetes     HPI:  Recent issues:  Here for f/u diabetes evaluation  Right hip steroid injection to right hip mid 5/16/19, then hyperglycemia... BG effects now resolved        Diagnosis of diabetes mellitus in his 50's  Recalls high glucose levels during medical evaluation with Dr. Alexander Cortez/[facility]  Initial treatment with metformin medication  Has also use glyburide and Lantus insulin medications in the past  Had seen me for diabetes evaluations years ago  2012- 2018. Moved to [place], [place]  Recalls participating with diabetes study while in [place]   Tried a different type of insulin   Recalls having a hgbA1c near 5.8%  Has continued on metformin and Toujeo insulin medications    Previous [facility] labs include:        Previous [facility] hgbA1c levels include:     Lab Test 04/11/12  0929 10/21/11  1100 04/23/11  1940 03/04/11  0731   A1C 8.7* 8.3* 7.7* 7.6*     Current DM medications:   Metformin 1000 mg BID   Toujeo 20 units at bedtime    Using a BG meter (name?)    Testing 2x/day   Recent trends am<pm    Exercises with step class or weights at [facility] 3x/week, also some golf  Recent [facility] labs include:  Lab Results   Component Value Date    A1C 6.9 (H) 05/13/2019     05/13/2019    POTASSIUM 4.2 05/13/2019    CHLORIDE 111 (H) 05/13/2019    CO2 27 05/13/2019    ANIONGAP 2 (L) 05/13/2019     (H) 05/13/2019    BUN 20 05/13/2019    CR 1.27 (H) 05/13/2019    GFRESTIMATED 54 (L) 05/13/2019    GFRESTBLACK 63 05/13/2019    SISI 8.9 05/13/2019    CHOL 123 04/11/2012    TRIG 143 04/11/2012    HDL 35 (L) 04/11/2012    LDL 60 04/11/2012    UCRR 227 05/13/2019    MICROL 25 05/13/2019    UMALCR 10.93 05/13/2019    TSH 1.89 03/04/2011     Last eye exam 1/2019, no DR  DM Complications:   Macrovascular disease    Recalls having MI, though details not available    CAD, had 2-coronary stents in 2009 and also 5/2015       , lives in  [place]  Sees Dr. Bon Su/[facility] for general medicine evaluations.    PMH/PSH:  Past Medical History:   Diagnosis Date     ASCVD (arteriosclerotic cardiovascular disease)     NSTMI; PTCA of LAD     Degeneration of lumbar or lumbosacral intervertebral disc      Depression      Essential hypertension, benign      Gout      Other and unspecified hyperlipidemia      Thoracic or lumbosacral neuritis or radiculitis, unspecified      Type II or unspecified type diabetes mellitus without mention of complication, not stated as uncontrolled      Past Surgical History:   Procedure Laterality Date     ANGIOPLASTY  09    PTCA of LAD at [facility]     APPENDECTOMY      abscessed appendix     C INJECT EPIDURAL ANEST,LUMBAR,SINGLE  07    Right L5-S1 translaminar epidural under fluoroscopy with corticosteroid     C LUMBAR SPINE FUSN,POST TECH  08    Bilateral L4-5 decompression, Posterolateral fusion L4-5 with pedicular fixation and local autograft     HC COLONOSCOPY THRU STOMA, DIAGNOSTIC      no polyps, no diverticuli     HC COLONOSCOPY THRU STOMA, DIAGNOSTIC  07    Normal colonoscopy at [facility]     HC REPAIR ING HERNIA,5+Y/O,REDUCIBL  1960's    bilateral inguinal hernia repair     LAPAROSCOPIC HERNIORRHAPHY VENTRAL  11/12/10    Laparoscopic ventral hernia repair with Proceed patch       Family Hx:  Family History   Problem Relation Age of Onset     C.A.D. Father      Diabetes Father      Family History Negative Mother      Blood Disease Sister          acute leukemia age 56     Family History Negative Sister      Family History Negative Sister          Social Hx:  Social History     Socioeconomic History     Marital status:      Spouse name: Not on file     Number of children: Not on file     Years of education: Not on file     Highest education level: Not on file   Occupational History     Not on file   Social Needs     Financial resource strain: Not on file     Food  insecurity:     Worry: Not on file     Inability: Not on file     Transportation needs:     Medical: Not on file     Non-medical: Not on file   Tobacco Use     Smoking status: Former Smoker     Types: Cigars     Smokeless tobacco: Former User     Quit date: 4/13/2012     Tobacco comment: former 3 cigars per week (socially)   Substance and Sexual Activity     Alcohol use: Yes     Comment: occasionally     Drug use: No     Sexual activity: Yes     Partners: Female   Lifestyle     Physical activity:     Days per week: Not on file     Minutes per session: Not on file     Stress: Not on file   Relationships     Social connections:     Talks on phone: Not on file     Gets together: Not on file     Attends Catholic service: Not on file     Active member of club or organization: Not on file     Attends meetings of clubs or organizations: Not on file     Relationship status: Not on file     Intimate partner violence:     Fear of current or ex partner: Not on file     Emotionally abused: Not on file     Physically abused: Not on file     Forced sexual activity: Not on file   Other Topics Concern     Parent/sibling w/ CABG, MI or angioplasty before 65F 55M? Not Asked   Social History Narrative     Not on file          MEDICATIONS:  has a current medication list which includes the following prescription(s): amlodipine, aspirin, atorvastatin, BD ULTRA FINE PEN NEEDLES, carvedilol, blood glucose, insulin detemir, insulin glargine u-300, insulin pen needle, losartan, magnesium oxide, metformin, nitroglycerin, and vitamin d (cholecalciferol).    ROS:     ROS: 10 point ROS neg other than the symptoms noted above in the HPI.    GENERAL: mild fatigue, previous wt loss and recent wt stable; denies fevers, chills, night sweats.   HEENT: nasal symptoms as noted; no dysphagia, odonophagia, diplopia, neck pain  THYROID:  no apparent hyper or hypothyroid symptoms  CV: no chest pain, pressure, palpitations  LUNGS: no SOB, SINGH, cough,  "wheezing   ABDOMEN: no diarrhea, constipation, abdominal pain  EXTREMITIES: no rashes, ulcers, edema  NEUROLOGY: no headaches, denies changes in vision, tingling, extremitiy numbness   MSK: some achniess; denies muscle weakness  SKIN: no rashes or lesions  : occasional nocturia  PSYCH:  stable mood, no significant anxiety or depression  ENDOCRINE: no heat or cold intolerance    Physical Exam   VS: /72   Pulse (!) 49   Ht 1.88 m (6' 2\")   Wt 94.9 kg (209 lb 3.2 oz)   BMI 26.86 kg/m    GENERAL: AXOX3, NAD, well dressed, answering questions appropriately, appears stated age.  THYROID:  normal gland, no apparent nodules or goiter  ABDOMEN: soft, nontender, nondistended  EXTREMITIES: mild ankle edema  NEUROLOGY: CN grossly intact, no tremors  MSK: grossly intact  SKIN: no rashes, no lesions    LABS:    All pertinent notes, labs, and images personally reviewed by me.     A/P:  Encounter Diagnosis   Name Primary?     Type 2 diabetes mellitus with other circulatory complication, with long-term current use of insulin (H) Yes       Comments:  Reviewed health history and diabetes issues.  Recent glucose control good    Plan:  Discussed general issues with the diabetes diagnosis and management  We discussed the hgbA1c test which reflects previous overall glucose levels or control  Discussed the importance of blood glucose (BG) testing to assess glucose trends  Provided general overview of the diabetes medication options and medication treatment plan.    Recommend:  Continue current metformin and Toujeo insulin medications at this time  Consider changing from basal insulin to a GLP1RA medication such as Victoza, Ozempic, or Trulicity   Check with [facility] physician to learn if we can prescribe VA-approved meds  Goal target FBG  mg/dl  No labs ordered today  Continue current hypertension and atorvastatin medications  Keep focus on diet, exercise, weight management.  Advise having fasting lipid panel testing and " dilated eye examination, at least annually    See PCP about memory issues/concerns  Addressed patient questions today    Patient Instructions   Continue current metformin and Toujeo med use   Non-insulin injection options:    Victoza daily injection    Ozempic or Trulicity weekly injection medication     Check with [facility] physician to learn if we can prescribe VA-approved meds  Goal target fasting glucose  mg/dl  Contact Dr. Carvalho when [facility] Rx information available      Labs ordered today:   No orders of the defined types were placed in this encounter.    Radiology/Consults ordered today: None    More than 50% of the time spent with Mr. Herrera on counseling / coordinating his care.  Total appointment time was 20 minutes.    Follow-up:  3-4 mo    Adriano Carvalho MD  Endocrinology  [facility]  CC: Bon Su

## 2019-06-04 NOTE — LETTER
6/4/2019         RE: Hilario Herrera  [id number]  [facility] [address]        Dear Colleague,    Thank you for referring your patient, Hilario Herrera, to the [facility]. Please see a copy of my visit note below.    Name: Hilario Herrera      Chief Complaint   Patient presents with     Diabetes     HPI:  Recent issues:  Here for f/u diabetes evaluation  Right hip steroid injection to right hip mid 5/16/19, then hyperglycemia... BG effects now resolved        Diagnosis of diabetes mellitus in his 50's  Recalls high glucose levels during medical evaluation with Dr. Alexander Cortez/[facility]  Initial treatment with metformin medication  Has also use glyburide and Lantus insulin medications in the past  Had seen me for diabetes evaluations years ago  2012- 2018. Moved to [place], [place]  Recalls participating with diabetes study while in [place]   Tried a different type of insulin   Recalls having a hgbA1c near 5.8%  Has continued on metformin and Toujeo insulin medications    Previous [facility] labs include:        Previous [facility] hgbA1c levels include:     Lab Test 04/11/12  0929 10/21/11  1100 04/23/11  1940 03/04/11  0731   A1C 8.7* 8.3* 7.7* 7.6*     Current DM medications:   Metformin 1000 mg BID   Toujeo 20 units at bedtime    Using a BG meter (name?)    Testing 2x/day   Recent trends am<pm    Exercises with step class or weights at [place] 3x/week, also some golf  Recent [facility] labs include:  Lab Results   Component Value Date    A1C 6.9 (H) 05/13/2019     05/13/2019    POTASSIUM 4.2 05/13/2019    CHLORIDE 111 (H) 05/13/2019    CO2 27 05/13/2019    ANIONGAP 2 (L) 05/13/2019     (H) 05/13/2019    BUN 20 05/13/2019    CR 1.27 (H) 05/13/2019    GFRESTIMATED 54 (L) 05/13/2019    GFRESTBLACK 63 05/13/2019    SISI 8.9 05/13/2019    CHOL 123 04/11/2012    TRIG 143 04/11/2012    HDL 35 (L) 04/11/2012    LDL 60 04/11/2012    UCRR 227 05/13/2019    MICROL 25 05/13/2019    UMALCR  10.93 2019    TSH 1.89 2011     Last eye exam 2019, no DR  DM Complications:   Macrovascular disease    Recalls having MI, though details not available    CAD, had 2-coronary stents in  and also 2015       , lives in [place]  Sees Dr. Bon Su/[facility] for general medicine evaluations.    PMH/PSH:  Past Medical History:   Diagnosis Date     ASCVD (arteriosclerotic cardiovascular disease)     NSTMI; PTCA of LAD     Degeneration of lumbar or lumbosacral intervertebral disc      Depression      Essential hypertension, benign      Gout      Other and unspecified hyperlipidemia      Thoracic or lumbosacral neuritis or radiculitis, unspecified      Type II or unspecified type diabetes mellitus without mention of complication, not stated as uncontrolled      Past Surgical History:   Procedure Laterality Date     ANGIOPLASTY  09    PTCA of LAD at [facility]     APPENDECTOMY      abscessed appendix     C INJECT EPIDURAL ANEST,LUMBAR,SINGLE  07    Right L5-S1 translaminar epidural under fluoroscopy with corticosteroid     C LUMBAR SPINE FUSN,POST TECH  08    Bilateral L4-5 decompression, Posterolateral fusion L4-5 with pedicular fixation and local autograft     HC COLONOSCOPY THRU STOMA, DIAGNOSTIC      no polyps, no diverticuli     HC COLONOSCOPY THRU STOMA, DIAGNOSTIC  07    Normal colonoscopy at [facility]     HC REPAIR ING HERNIA,5+Y/O,REDUCIBL  1960's    bilateral inguinal hernia repair     LAPAROSCOPIC HERNIORRHAPHY VENTRAL  11/12/10    Laparoscopic ventral hernia repair with Proceed patch       Family Hx:  Family History   Problem Relation Age of Onset     C.A.D. Father      Diabetes Father      Family History Negative Mother      Blood Disease Sister          acute leukemia age 56     Family History Negative Sister      Family History Negative Sister          Social Hx:  Social History     Socioeconomic History     Marital status:       Spouse name: Not on file     Number of children: Not on file     Years of education: Not on file     Highest education level: Not on file   Occupational History     Not on file   Social Needs     Financial resource strain: Not on file     Food insecurity:     Worry: Not on file     Inability: Not on file     Transportation needs:     Medical: Not on file     Non-medical: Not on file   Tobacco Use     Smoking status: Former Smoker     Types: Cigars     Smokeless tobacco: Former User     Quit date: 4/13/2012     Tobacco comment: former 3 cigars per week (socially)   Substance and Sexual Activity     Alcohol use: Yes     Comment: occasionally     Drug use: No     Sexual activity: Yes     Partners: Female   Lifestyle     Physical activity:     Days per week: Not on file     Minutes per session: Not on file     Stress: Not on file   Relationships     Social connections:     Talks on phone: Not on file     Gets together: Not on file     Attends Buddhism service: Not on file     Active member of club or organization: Not on file     Attends meetings of clubs or organizations: Not on file     Relationship status: Not on file     Intimate partner violence:     Fear of current or ex partner: Not on file     Emotionally abused: Not on file     Physically abused: Not on file     Forced sexual activity: Not on file   Other Topics Concern     Parent/sibling w/ CABG, MI or angioplasty before 65F 55M? Not Asked   Social History Narrative     Not on file          MEDICATIONS:  has a current medication list which includes the following prescription(s): amlodipine, aspirin, atorvastatin, BD ULTRA FINE PEN NEEDLES, carvedilol, blood glucose, insulin detemir, insulin glargine u-300, insulin pen needle, losartan, magnesium oxide, metformin, nitroglycerin, and vitamin d (cholecalciferol).    ROS:     ROS: 10 point ROS neg other than the symptoms noted above in the HPI.    GENERAL: mild fatigue, previous wt loss and recent wt stable;  "denies fevers, chills, night sweats.   HEENT: nasal symptoms as noted; no dysphagia, odonophagia, diplopia, neck pain  THYROID:  no apparent hyper or hypothyroid symptoms  CV: no chest pain, pressure, palpitations  LUNGS: no SOB, SINGH, cough, wheezing   ABDOMEN: no diarrhea, constipation, abdominal pain  EXTREMITIES: no rashes, ulcers, edema  NEUROLOGY: no headaches, denies changes in vision, tingling, extremitiy numbness   MSK: some achniess; denies muscle weakness  SKIN: no rashes or lesions  : occasional nocturia  PSYCH:  stable mood, no significant anxiety or depression  ENDOCRINE: no heat or cold intolerance    Physical Exam   VS: /72   Pulse (!) 49   Ht 1.88 m (6' 2\")   Wt 94.9 kg (209 lb 3.2 oz)   BMI 26.86 kg/m     GENERAL: AXOX3, NAD, well dressed, answering questions appropriately, appears stated age.  THYROID:  normal gland, no apparent nodules or goiter  ABDOMEN: soft, nontender, nondistended  EXTREMITIES: mild ankle edema  NEUROLOGY: CN grossly intact, no tremors  MSK: grossly intact  SKIN: no rashes, no lesions    LABS:    All pertinent notes, labs, and images personally reviewed by me.     A/P:  Encounter Diagnosis   Name Primary?     Type 2 diabetes mellitus with other circulatory complication, with long-term current use of insulin (H) Yes       Comments:  Reviewed health history and diabetes issues.  Recent glucose control good    Plan:  Discussed general issues with the diabetes diagnosis and management  We discussed the hgbA1c test which reflects previous overall glucose levels or control  Discussed the importance of blood glucose (BG) testing to assess glucose trends  Provided general overview of the diabetes medication options and medication treatment plan.    Recommend:  Continue current metformin and Toujeo insulin medications at this time  Consider changing from basal insulin to a GLP1RA medication such as Victoza, Ozempic, or Trulicity   Check with [facility] physician to learn if we " can prescribe [facility]-approved meds  Goal target FBG  mg/dl  No labs ordered today  Continue current hypertension and atorvastatin medications  Keep focus on diet, exercise, weight management.  Advise having fasting lipid panel testing and dilated eye examination, at least annually    See PCP about memory issues/concerns  Addressed patient questions today    Patient Instructions   Continue current metformin and Toujeo med use   Non-insulin injection options:    Victoza daily injection    Ozempic or Trulicity weekly injection medication     Check with [facility] physician to learn if we can prescribe VA-approved meds  Goal target fasting glucose  mg/dl  Contact Dr. Carvalho when [facility] Rx information available      Labs ordered today:   No orders of the defined types were placed in this encounter.    Radiology/Consults ordered today: None    More than 50% of the time spent with Mr. Herrera on counseling / coordinating his care.  Total appointment time was 20 minutes.    Follow-up:  3-4 mo    Adriano Carvalho MD  Endocrinology  [facility]  CC: Bon Su       Again, thank you for allowing me to participate in the care of your patient.        Sincerely,        Adriano Carvalho MD

## 2019-09-09 ENCOUNTER — OFFICE VISIT (OUTPATIENT)
Dept: ENDOCRINOLOGY | Facility: CLINIC | Age: 79
End: 2019-09-09
Payer: MEDICARE

## 2019-09-09 VITALS
DIASTOLIC BLOOD PRESSURE: 62 MMHG | SYSTOLIC BLOOD PRESSURE: 109 MMHG | HEART RATE: 50 BPM | HEIGHT: 74 IN | WEIGHT: 211 LBS | BODY MASS INDEX: 27.08 KG/M2

## 2019-09-09 DIAGNOSIS — Z79.4 TYPE 2 DIABETES MELLITUS WITH OTHER CIRCULATORY COMPLICATION, WITH LONG-TERM CURRENT USE OF INSULIN (H): Primary | ICD-10-CM

## 2019-09-09 DIAGNOSIS — E11.59 TYPE 2 DIABETES MELLITUS WITH OTHER CIRCULATORY COMPLICATION, WITH LONG-TERM CURRENT USE OF INSULIN (H): Primary | ICD-10-CM

## 2019-09-09 PROCEDURE — 99213 OFFICE O/P EST LOW 20 MIN: CPT | Performed by: INTERNAL MEDICINE

## 2019-09-09 ASSESSMENT — MIFFLIN-ST. JEOR: SCORE: 1756.84

## 2019-09-09 NOTE — PROGRESS NOTES
Name: Hilario Herrera    Chief Complaint   Patient presents with     Diabetes     HPI:  Recent issues:  Here for f/u diabetes evaluation  Feeling pretty well, yet has left knee pains... gets periodic injections (steroids vs lubricant)  Still exercising regularly at [facility]        Diagnosis of diabetes mellitus in his 50's  Recalls high glucose levels during medical evaluation with Dr. Alexander Cortez/[facility]  Initial treatment with metformin medication  Has also use glyburide and Lantus insulin medications in the past  Had seen me for diabetes evaluations years ago  2012- 2018. Moved to [place], [place]  Recalls participating with diabetes study while in [place]   Tried a different type of insulin   Recalls having a hgbA1c near 5.8%  Has continued on metformin and Toujeo insulin medications    Previous [facility] labs include:          Previous [facility] hgbA1c levels include:     Lab Test 04/11/12  0929 10/21/11  1100 04/23/11  1940 03/04/11  0731   A1C 8.7* 8.3* 7.7* 7.6*     Current DM medications:   Metformin 1000 mg BID   Toujeo 24 units at bedtime    Using a BG meter (name?)    Testing 2x/day   Recent trends am<pm, trends am , -219 mg/dl    Exercises with step class or weights at [facility] 3x/week, also some golf  Recent [facility] labs include:  Lab Results   Component Value Date    A1C 6.9 (H) 05/13/2019     05/13/2019    POTASSIUM 4.2 05/13/2019    CHLORIDE 111 (H) 05/13/2019    CO2 27 05/13/2019    ANIONGAP 2 (L) 05/13/2019     (H) 05/13/2019    BUN 20 05/13/2019    CR 1.27 (H) 05/13/2019    GFRESTIMATED 54 (L) 05/13/2019    GFRESTBLACK 63 05/13/2019    SISI 8.9 05/13/2019    CHOL 123 04/11/2012    TRIG 143 04/11/2012    HDL 35 (L) 04/11/2012    LDL 60 04/11/2012    UCRR 227 05/13/2019    MICROL 25 05/13/2019    UMALCR 10.93 05/13/2019    TSH 1.89 03/04/2011     Last eye exam 1/2019, no DR  DM Complications:   Macrovascular disease    Recalls having MI, though details not available    CAD, had  2-coronary stents in  and also 2015       , lives in [place]  Sees Dr. Bon Su/[facility] for general medicine evaluations.    PMH/PSH:  Past Medical History:   Diagnosis Date     ASCVD (arteriosclerotic cardiovascular disease)     NSTMI; PTCA of LAD     Degeneration of lumbar or lumbosacral intervertebral disc      Depression      Essential hypertension, benign      Gout      Other and unspecified hyperlipidemia      Thoracic or lumbosacral neuritis or radiculitis, unspecified      Type II or unspecified type diabetes mellitus without mention of complication, not stated as uncontrolled      Past Surgical History:   Procedure Laterality Date     ANGIOPLASTY  09    PTCA of LAD at [facility]     APPENDECTOMY      abscessed appendix     C INJECT EPIDURAL ANEST,LUMBAR,SINGLE  07    Right L5-S1 translaminar epidural under fluoroscopy with corticosteroid     C LUMBAR SPINE FUSN,POST TECH  08    Bilateral L4-5 decompression, Posterolateral fusion L4-5 with pedicular fixation and local autograft     HC COLONOSCOPY THRU STOMA, DIAGNOSTIC      no polyps, no diverticuli     HC COLONOSCOPY THRU STOMA, DIAGNOSTIC  07    Normal colonoscopy at [facility]     HC REPAIR ING HERNIA,5+Y/O,REDUCIBL  1960's    bilateral inguinal hernia repair     LAPAROSCOPIC HERNIORRHAPHY VENTRAL  11/12/10    Laparoscopic ventral hernia repair with Proceed patch       Family Hx:  Family History   Problem Relation Age of Onset     C.A.D. Father      Diabetes Father      Family History Negative Mother      Blood Disease Sister          acute leukemia age 56     Family History Negative Sister      Family History Negative Sister          Social Hx:  Social History     Socioeconomic History     Marital status:      Spouse name: Not on file     Number of children: Not on file     Years of education: Not on file     Highest education level: Not on file   Occupational History     Not on file    Social Needs     Financial resource strain: Not on file     Food insecurity:     Worry: Not on file     Inability: Not on file     Transportation needs:     Medical: Not on file     Non-medical: Not on file   Tobacco Use     Smoking status: Former Smoker     Types: Cigars     Smokeless tobacco: Former User     Quit date: 4/13/2012     Tobacco comment: former 3 cigars per week (socially)   Substance and Sexual Activity     Alcohol use: Yes     Comment: occasionally     Drug use: No     Sexual activity: Yes     Partners: Female   Lifestyle     Physical activity:     Days per week: Not on file     Minutes per session: Not on file     Stress: Not on file   Relationships     Social connections:     Talks on phone: Not on file     Gets together: Not on file     Attends Bahai service: Not on file     Active member of club or organization: Not on file     Attends meetings of clubs or organizations: Not on file     Relationship status: Not on file     Intimate partner violence:     Fear of current or ex partner: Not on file     Emotionally abused: Not on file     Physically abused: Not on file     Forced sexual activity: Not on file   Other Topics Concern     Parent/sibling w/ CABG, MI or angioplasty before 65F 55M? Not Asked   Social History Narrative     Not on file          MEDICATIONS:  has a current medication list which includes the following prescription(s): amlodipine, aspirin, atorvastatin, BD ULTRA FINE PEN NEEDLES, carvedilol, blood glucose, insulin detemir, insulin glargine u-300, insulin pen needle, losartan, magnesium oxide, metformin, nitroglycerin, and vitamin d (cholecalciferol).    ROS:     ROS: 10 point ROS neg other than the symptoms noted above in the HPI.    GENERAL: mild fatigue, wt stable; denies fevers, chills, night sweats.   HEENT: nasal symptoms as noted; no dysphagia, odonophagia, diplopia, neck pain  THYROID:  no apparent hyper or hypothyroid symptoms  CV: no chest pain, pressure,  "palpitations  LUNGS: no SOB, SINGH, cough, wheezing   ABDOMEN: no diarrhea, constipation, abdominal pain  EXTREMITIES: no rashes, ulcers, edema  NEUROLOGY: no headaches, denies changes in vision, tingling, extremitiy numbness   MSK: left knee pains; denies muscle weakness  SKIN: no rashes or lesions  : occasional nocturia  PSYCH:  stable mood, no significant anxiety or depression  ENDOCRINE: no heat or cold intolerance    Physical Exam   VS: /62 (BP Location: Right arm, Cuff Size: Adult Large)   Pulse 50   Ht 1.88 m (6' 2\")   Wt 95.7 kg (211 lb)   BMI 27.09 kg/m    GENERAL: AXOX3, NAD, well dressed, answering questions appropriately, appears stated age.  THYROID:  normal gland, no apparent nodules or goiter  ABDOMEN: soft, nontender, nondistended  EXTREMITIES: mild ankle edema  NEUROLOGY: CN grossly intact, no tremors  MSK: grossly intact  SKIN: no rashes, no lesions    LABS:    All pertinent notes, labs, and images personally reviewed by me.     A/P:  Encounter Diagnosis   Name Primary?     Type 2 diabetes mellitus with other circulatory complication, with long-term current use of insulin (H) Yes       Comments:  Reviewed health history and diabetes issues.  Recent glucose control good    Plan:  Discussed general issues with the diabetes diagnosis and management  We discussed the hgbA1c test which reflects previous overall glucose levels or control  Discussed the importance of blood glucose (BG) testing to assess glucose trends  Provided general overview of the diabetes medication options and medication treatment plan.    Recommend:  Continue current metformin and Toujeo insulin med dosing at this time  Consider changing from basal insulin to a GLP1RA medication such as Victoza, Ozempic, or Trulicity   Would be helpful to check with [facility] physician to learn if we can prescribe [facility]-approved meds  Goal target FBG  mg/dl  No labs ordered today  Continue current hypertension and atorvastatin " medications  Keep focus on diet, exercise, weight management.  Advise having fasting lipid panel testing and dilated eye examination, at least annually    See PCP about memory issues/concerns  Addressed patient questions today    Labs ordered today:   No orders of the defined types were placed in this encounter.    Radiology/Consults ordered today: None    More than 50% of the time spent with Mr. Herrera on counseling / coordinating his care.  Total appointment time was 20 minutes.    Follow-up:  4 mo    ROBERTO CARLOS Carvalho MD, MS [facility]

## 2019-10-29 DIAGNOSIS — E11.9 TYPE 2 DIABETES, HBA1C GOAL < 8% (H): Primary | ICD-10-CM

## 2019-10-29 RX ORDER — GLUCOSAMINE HCL/CHONDROITIN SU 500-400 MG
CAPSULE ORAL
Qty: 100 EACH | Refills: 3 | Status: CANCELLED | OUTPATIENT
Start: 2019-10-29

## 2019-10-29 RX ORDER — LANCETS
EACH MISCELLANEOUS
Refills: 6 | Status: CANCELLED | OUTPATIENT
Start: 2019-10-29

## 2019-10-29 NOTE — TELEPHONE ENCOUNTER
Reason for Call:  Medication or medication refill: ACCUChek strips for his meter    Do you use a [facility]?  Name of the pharmacy and phone number for the current request:      [facility]       Name of the medication requested:ACCUChek  Strips for his meter       Other request: Pt called requesting Rx be sent to pharmacy.       Can we leave a detailed message on this number? YES    Phone number patient can be reached at: Cell number on file:    Telephone Information:   Mobile [phone]       Best Time: Anytime    Call taken on 10/29/2019 at 9:52 AM by Yogi Salazar

## 2019-10-30 NOTE — TELEPHONE ENCOUNTER
Prescription approved per [facility] Refill Protocol.  Fe VALLE RN

## 2019-11-01 ENCOUNTER — TELEPHONE (OUTPATIENT)
Dept: ENDOCRINOLOGY | Facility: CLINIC | Age: 79
End: 2019-11-01

## 2019-11-01 DIAGNOSIS — E11.9 TYPE 2 DIABETES, HBA1C GOAL < 8% (H): ICD-10-CM

## 2019-11-01 NOTE — TELEPHONE ENCOUNTER
Prescription approved per [facility] Refill Protocol.    Vanessa SWANN RN

## 2019-11-01 NOTE — TELEPHONE ENCOUNTER
Prescription approved per [facility] [place] [facility].  Vanessa SWANN RN

## 2019-11-01 NOTE — TELEPHONE ENCOUNTER
Reason for Call:  Other prescription    Detailed comments: Patient is calling today because he wants to request he get a RX for acucheck strips  BIANCA plus to check 2 times a day   [facility] [place] [phone]  Phone Number Patient can be reached at: Cell number on file:    Telephone Information:   Mobile [phone]       Best Time: any     Can we leave a detailed message on this number? YES    Call taken on 11/1/2019 at 9:56 AM by Chante Falk

## 2019-12-04 LAB
CHOLEST SERPL-MCNC: 141 MG/DL (ref 100–199)
HDLC SERPL-MCNC: 40 MG/DL
LDLC SERPL CALC-MCNC: 66 MG/DL
NONHDLC SERPL-MCNC: NORMAL MG/DL
TRIGL SERPL-MCNC: 175 MG/DL

## 2020-01-06 ENCOUNTER — OFFICE VISIT (OUTPATIENT)
Dept: ENDOCRINOLOGY | Facility: CLINIC | Age: 80
End: 2020-01-06
Payer: MEDICARE

## 2020-01-06 VITALS
HEIGHT: 74 IN | DIASTOLIC BLOOD PRESSURE: 68 MMHG | SYSTOLIC BLOOD PRESSURE: 136 MMHG | HEART RATE: 56 BPM | WEIGHT: 213 LBS | BODY MASS INDEX: 27.34 KG/M2

## 2020-01-06 DIAGNOSIS — Z79.4 TYPE 2 DIABETES MELLITUS WITH OTHER CIRCULATORY COMPLICATION, WITH LONG-TERM CURRENT USE OF INSULIN (H): Primary | ICD-10-CM

## 2020-01-06 DIAGNOSIS — E11.59 TYPE 2 DIABETES MELLITUS WITH OTHER CIRCULATORY COMPLICATION, WITH LONG-TERM CURRENT USE OF INSULIN (H): Primary | ICD-10-CM

## 2020-01-06 PROCEDURE — 99213 OFFICE O/P EST LOW 20 MIN: CPT | Performed by: INTERNAL MEDICINE

## 2020-01-06 PROCEDURE — 99207 C FOOT EXAM  NO CHARGE: CPT | Performed by: INTERNAL MEDICINE

## 2020-01-06 RX ORDER — NICOTINE POLACRILEX 4 MG/1
20 GUM, CHEWING ORAL PRN
COMMUNITY
Start: 2019-12-17

## 2020-01-06 ASSESSMENT — MIFFLIN-ST. JEOR: SCORE: 1760.91

## 2020-01-06 NOTE — PROGRESS NOTES
"Name: Hilario Herrera    Chief Complaint   Patient presents with     Diabetes     HPI:  Recent issues:  Here for f/u diabetes evaluation  Had knee and hip cortisone injections 12/2/19  Recent \"stopmach problems\", advised to take Prilosec course          Diagnosis of diabetes mellitus in his 50's  Recalls high glucose levels during medical evaluation with Dr. Alexander Cortez/[facility]  Initial treatment with metformin medication  Has also use glyburide and Lantus insulin medications in the past  Had seen me for diabetes evaluations years ago  2012- 2018. Moved to [place], [place]  Recalls participating with diabetes study while in [place]   Tried a different type of insulin   Recalls having a hgbA1c near 5.8%  Has continued on metformin and Toujeo insulin medications    Previous [facility] labs include:          Previous [facility] hgbA1c levels include:     Lab Test 04/11/12  0929 10/21/11  1100 04/23/11  1940 03/04/11  0731   A1C 8.7* 8.3* 7.7* 7.6*     Current DM medications:   Metformin 1000 mg  Morning and evening   Toujeo 24 units  Morning    Using a BG meter (name?)    Testing 2x/day   Recent FBG  am and 157-232 pm    Exercises with step class or weights at [facility] 3x/week, also some golf  Recent [facility] labs include:  Lab Results   Component Value Date    A1C 6.9 (H) 05/13/2019     05/13/2019    POTASSIUM 4.2 05/13/2019    CHLORIDE 111 (H) 05/13/2019    CO2 27 05/13/2019    ANIONGAP 2 (L) 05/13/2019     (H) 05/13/2019    BUN 20 05/13/2019    CR 1.27 (H) 05/13/2019    GFRESTIMATED 54 (L) 05/13/2019    GFRESTBLACK 63 05/13/2019    SISI 8.9 05/13/2019    CHOL 123 04/11/2012    TRIG 143 04/11/2012    HDL 35 (L) 04/11/2012    LDL 60 04/11/2012    UCRR 227 05/13/2019    MICROL 25 05/13/2019    UMALCR 10.93 05/13/2019    TSH 1.89 03/04/2011     Last eye exam 1/2019, no DR  DM Complications:   Macrovascular disease    Recalls having MI, though details not available    CAD, had 2-coronary stents in 2009 and " also 2015       , lives in [place]  Sees Dr. Bon Su/[facility] for general medicine evaluations.    PMH/PSH:  Past Medical History:   Diagnosis Date     ASCVD (arteriosclerotic cardiovascular disease)     NSTMI; PTCA of LAD     Degeneration of lumbar or lumbosacral intervertebral disc      Depression      Essential hypertension, benign      Gout      Other and unspecified hyperlipidemia      Thoracic or lumbosacral neuritis or radiculitis, unspecified      Type II or unspecified type diabetes mellitus without mention of complication, not stated as uncontrolled      Past Surgical History:   Procedure Laterality Date     ANGIOPLASTY  09    PTCA of LAD at [facility]     APPENDECTOMY      abscessed appendix     C INJECT EPIDURAL ANEST,LUMBAR,SINGLE  07    Right L5-S1 translaminar epidural under fluoroscopy with corticosteroid     C LUMBAR SPINE FUSN,POST TECH  08    Bilateral L4-5 decompression, Posterolateral fusion L4-5 with pedicular fixation and local autograft     HC COLONOSCOPY THRU STOMA, DIAGNOSTIC      no polyps, no diverticuli     HC COLONOSCOPY THRU STOMA, DIAGNOSTIC  07    Normal colonoscopy at [facility]     HC REPAIR ING HERNIA,5+Y/O,REDUCIBL  1960's    bilateral inguinal hernia repair     LAPAROSCOPIC HERNIORRHAPHY VENTRAL  11/12/10    Laparoscopic ventral hernia repair with Proceed patch       Family Hx:  Family History   Problem Relation Age of Onset     C.A.D. Father      Diabetes Father      Family History Negative Mother      Blood Disease Sister          acute leukemia age 56     Family History Negative Sister      Family History Negative Sister          Social Hx:  Social History     Socioeconomic History     Marital status:      Spouse name: Not on file     Number of children: Not on file     Years of education: Not on file     Highest education level: Not on file   Occupational History     Not on file   Social Needs     Financial  resource strain: Not on file     Food insecurity:     Worry: Not on file     Inability: Not on file     Transportation needs:     Medical: Not on file     Non-medical: Not on file   Tobacco Use     Smoking status: Former Smoker     Types: Cigars     Smokeless tobacco: Former User     Quit date: 4/13/2012     Tobacco comment: former 3 cigars per week (socially)   Substance and Sexual Activity     Alcohol use: Yes     Comment: occasionally     Drug use: No     Sexual activity: Yes     Partners: Female   Lifestyle     Physical activity:     Days per week: Not on file     Minutes per session: Not on file     Stress: Not on file   Relationships     Social connections:     Talks on phone: Not on file     Gets together: Not on file     Attends [employer] service: Not on file     Active member of club or organization: Not on file     Attends meetings of clubs or organizations: Not on file     Relationship status: Not on file     Intimate partner violence:     Fear of current or ex partner: Not on file     Emotionally abused: Not on file     Physically abused: Not on file     Forced sexual activity: Not on file   Other Topics Concern     Parent/sibling w/ CABG, MI or angioplasty before 65F 55M? Not Asked   Social History Narrative     Not on file          MEDICATIONS:  has a current medication list which includes the following prescription(s): amlodipine, aspirin, atorvastatin, BD ULTRA FINE PEN NEEDLES, blood glucose, blood glucose, carvedilol, insulin glargine, insulin pen needle, losartan, magnesium oxide, metformin, nitroglycerin, and omeprazole.    ROS:     ROS: 10 point ROS neg other than the symptoms noted above in the HPI.    GENERAL: mild fatigue, wt stable; denies fevers, chills, night sweats.   HEENT: nasal symptoms as noted; no dysphagia, odonophagia, diplopia, neck pain  THYROID:  no apparent hyper or hypothyroid symptoms  CV: no chest pain, pressure, palpitations  LUNGS: no SOB, SINGH, cough, wheezing   ABDOMEN:  "no diarrhea, constipation, abdominal pain  EXTREMITIES: no rashes, ulcers, edema  NEUROLOGY: no headaches, denies changes in vision, tingling, extremitiy numbness   MSK: left knee pains; denies muscle weakness  SKIN: no rashes or lesions  : occasional nocturia  PSYCH:  stable mood, no significant anxiety or depression  ENDOCRINE: no heat or cold intolerance    Physical Exam   VS: /68 (BP Location: Right arm, Cuff Size: Adult Regular)   Pulse 56   Ht 1.88 m (6' 2\")   Wt 96.6 kg (213 lb)   BMI 27.35 kg/m    GENERAL: AXOX3, NAD, well dressed, answering questions appropriately, appears stated age.  THYROID:  normal gland, no apparent nodules or goiter  CV:  RRR without murmurs  LUNGS:  CTA posteriorly  ABDOMEN: soft, nontender, nondistended  EXTREMITIES: mild ankle edema, +pulses R/L DP3/3, no feet skin lesions  NEUROLOGY: CN grossly intact, no tremors  MSK: grossly intact  SKIN: no rashes, no lesions    LABS:    All pertinent notes, labs, and images personally reviewed by me.     A/P:  Encounter Diagnosis   Name Primary?     Type 2 diabetes mellitus with other circulatory complication, with long-term current use of insulin (H) Yes       Comments:  Reviewed health history and diabetes issues.  Recent glucose control good overall    Plan:  Discussed general issues with the diabetes diagnosis and management  We discussed the hgbA1c test which reflects previous overall glucose levels or control  Discussed the importance of blood glucose (BG) testing to assess glucose trends  Provided general overview of the diabetes medication options and medication treatment plan.    Recommend:  Continue current metformin and Toujeo medications, as noted  Consider changing from basal insulin to a GLP1RA medication such as Victoza, Ozempic, or Trulicity   Would be helpful to check with [facility] physician to learn if we can prescribe VA-approved meds  Goal target FBG  mg/dl  No labs ordered today  Continue current hypertension " and atorvastatin medications, per PCP  Keep focus on diet, exercise, weight management.  Advise having fasting lipid panel testing and dilated eye examination, at least annually    Addressed patient questions today    Patient Instructions   Diabetes medication plan:   Metformin 1000 mg  Morning and evening   Toujeo 22 units  Morning    Goal target fasting glucose 80- 150 mg/dl  Contact Dr. Carvalho's office if questions about   Diabetes management or   Future high glucose levels with steroid use    Plan followup evaluation in July 2020    Labs ordered today:   No orders of the defined types were placed in this encounter.    Radiology/Consults ordered today: None    More than 50% of the time spent with Mr. Herrera on counseling / coordinating his care.  Total appointment time was 20 minutes.    Follow-up:  6 mo    ROBERTO CARLOS Carvalho MD, MS  Endocrinology  [facility]

## 2020-01-06 NOTE — PATIENT INSTRUCTIONS
Diabetes medication plan:   Metformin 1000 mg  Morning and evening   Toujeo 22 units  Morning    Goal target fasting glucose 80- 150 mg/dl  Contact Dr. Carvalho's office if questions about   Diabetes management or   Future high glucose levels with steroid use    Plan followup evaluation in July 2020

## 2020-01-07 LAB
ALBUMIN SERPL-MCNC: 4 G/DL (ref 3.2–4.6)
ALP SERPL-CCNC: 78 U/L (ref 50–136)
ALT SERPL-CCNC: 27 U/L (ref 8–45)
ANION GAP SERPL CALCULATED.3IONS-SCNC: 9 MMOL/L (ref 5–18)
AST SERPL-CCNC: 25 U/L (ref 2–40)
BILIRUB SERPL-MCNC: 1.9 MG/DL (ref 0.2–1.2)
BUN SERPL-MCNC: 22 MG/DL (ref 8–25)
CALCIUM SERPL-MCNC: 8.6 MG/DL (ref 8.5–10.5)
CHLORIDE SERPLBLD-SCNC: 104 MMOL/L (ref 98–110)
CO2 SERPL-SCNC: 20 MMOL/L (ref 21–31)
CREAT SERPL-MCNC: 1.95 MG/DL (ref 0.72–1.25)
GFR SERPL CREATININE-BSD FRML MDRD: 41 ML/MIN/1.73M2
GLUCOSE SERPL-MCNC: 236 MG/DL (ref 65–100)
HEMOGLOBIN: 15.2 G/DL (ref 13.5–17.5)
LIPASE SERPL-CCNC: <5 U/L (ref 8–78)
PLATELET # BLD AUTO: 238 10^9/L (ref 140–440)
POTASSIUM SERPL-SCNC: 4.2 MMOL/L (ref 3.5–5)
PROT SERPL-MCNC: 6.4 G/DL (ref 6–8)
SODIUM SERPL-SCNC: 133 MMOL/L (ref 135–145)

## 2020-01-13 ENCOUNTER — PRE VISIT (OUTPATIENT)
Dept: ONCOLOGY | Facility: CLINIC | Age: 80
End: 2020-01-13

## 2020-01-13 NOTE — TELEPHONE ENCOUNTER
RECORDS STATUS - ALL OTHER DIAGNOSIS      RECORDS RECEIVED FROM: SELF   DATE RECEIVED: 01/29/2020   NOTES STATUS DETAILS   OFFICE NOTE from referring provider NA      Action    Action Taken PT IS SELF REFERRED . CDK

## 2020-03-10 ENCOUNTER — TELEPHONE (OUTPATIENT)
Dept: CARDIOLOGY | Facility: CLINIC | Age: 80
End: 2020-03-10

## 2020-04-02 ENCOUNTER — VIRTUAL VISIT (OUTPATIENT)
Dept: CARDIOLOGY | Facility: CLINIC | Age: 80
End: 2020-04-02
Attending: INTERNAL MEDICINE
Payer: MEDICARE

## 2020-04-02 DIAGNOSIS — Z95.5 S/P CORONARY ARTERY STENT PLACEMENT: ICD-10-CM

## 2020-04-02 DIAGNOSIS — Z53.9 NO SHOW: ICD-10-CM

## 2020-04-02 DIAGNOSIS — I10 ESSENTIAL HYPERTENSION, BENIGN: ICD-10-CM

## 2020-04-02 DIAGNOSIS — I25.10 ASCVD (ARTERIOSCLEROTIC CARDIOVASCULAR DISEASE): ICD-10-CM

## 2020-04-02 DIAGNOSIS — E78.5 HYPERLIPIDEMIA LDL GOAL <70: Primary | ICD-10-CM

## 2020-04-02 NOTE — PROGRESS NOTES
"Hilario Herrera is a 79 year old male who is being evaluated via a billable video visit.      The patient has been notified of following:     \"This video visit will be conducted via a call between you and your physician/provider. We have found that certain health care needs can be provided without the need for an in-person physical exam.  This service lets us provide the care you need with a video conversation.  If a prescription is necessary we can send it directly to your pharmacy.  If lab work is needed we can place an order for that and you can then stop by our lab to have the test done at a later time.    If during the course of the call the physician/provider feels a video visit is not appropriate, you will not be charged for this service.\"     Patient has given verbal consent for Video visit? Yes    Patient would like the video invitation sent by: Send to e-mail at: [email or web address]      "

## 2020-04-02 NOTE — PROGRESS NOTES
"Hilario Herrera is a 79 year old male who is being evaluated via a billable video visit.      The patient has been notified of following:     \"This video visit will be conducted via a call between you and your physician/provider. We have found that certain health care needs can be provided without the need for an in-person physical exam.  This service lets us provide the care you need with a video conversation.  If a prescription is necessary we can send it directly to your pharmacy.  If lab work is needed we can place an order for that and you can then stop by our lab to have the test done at a later time.    If during the course of the call the physician/provider feels a video visit is not appropriate, you will not be charged for this service.\"     Patient has given verbal consent for Video visit? Yes.     Patient would like the video invitation sent by: Send to e-mail at: [email or web address]      Hilario Herrera complains of    Chief Complaint   Patient presents with     Coronary Artery Disease       I have reviewed and updated the patient's Past Medical History, Social History, Family History and Medication List.    ALLERGIES  Lisinopril    BP:132/72  P: 57  Height:6-2\"  Weight: 200.4lb  Radha Law LPN      Video-Visit Details    Type of service:  Video Visit    Unable to reach patient via video or call.  I will place orders for 6 month follow up.     Shantanu Villasenor PA-C   4/2/2020  Pager: [phone]        This patient was a no show for this scheduled appointment.  "

## 2020-05-21 LAB
ERYTHROCYTE [DISTWIDTH] IN BLOOD BY AUTOMATED COUNT: 13.8 %
HCT VFR BLD AUTO: 41.3 %
HEMOGLOBIN: 14.2 G/DL (ref 13.5–17.5)
MCH RBC QN AUTO: 30.5 PG
MCHC RBC AUTO-ENTMCNC: 34.4 G/DL
MCV RBC AUTO: 89 FL
PLATELET # BLD AUTO: 225 10^9/L
RBC # BLD AUTO: 4.66 10^12/L
WBC # BLD AUTO: 8 10^9/L

## 2020-07-07 ENCOUNTER — VIRTUAL VISIT (OUTPATIENT)
Dept: ENDOCRINOLOGY | Facility: CLINIC | Age: 80
End: 2020-07-07
Payer: MEDICARE

## 2020-07-07 VITALS
HEART RATE: 63 BPM | DIASTOLIC BLOOD PRESSURE: 87 MMHG | OXYGEN SATURATION: 97 % | BODY MASS INDEX: 26.28 KG/M2 | HEIGHT: 74 IN | SYSTOLIC BLOOD PRESSURE: 141 MMHG | WEIGHT: 204.8 LBS

## 2020-07-07 DIAGNOSIS — Z79.4 TYPE 2 DIABETES MELLITUS WITH OTHER CIRCULATORY COMPLICATION, WITH LONG-TERM CURRENT USE OF INSULIN (H): Primary | ICD-10-CM

## 2020-07-07 DIAGNOSIS — E11.59 TYPE 2 DIABETES MELLITUS WITH OTHER CIRCULATORY COMPLICATION, WITH LONG-TERM CURRENT USE OF INSULIN (H): Primary | ICD-10-CM

## 2020-07-07 PROCEDURE — 99442 ZZC PHYSICIAN TELEPHONE EVALUATION 11-20 MIN: CPT | Performed by: INTERNAL MEDICINE

## 2020-07-07 ASSESSMENT — MIFFLIN-ST. JEOR: SCORE: 1723.72

## 2020-07-07 NOTE — LETTER
"    7/7/2020         RE: Hilario Herrera  [id number]  [place] [address]        Dear Colleague,    Thank you for referring your patient, Hilario Herrera, to the [facility]. Please see a copy of my visit note below.    Hilario Herrera is a 79 year old male who is being evaluated via a billable telephone visit.      The patient has been notified of following:     \"This telephone visit will be conducted via a call between you and your physician/provider. We have found that certain health care needs can be provided without the need for a physical exam.  This service lets us provide the care you need with a short phone conversation.  If a prescription is necessary we can send it directly to your pharmacy.  If lab work is needed we can place an order for that and you can then stop by our lab to have the test done at a later time.    Telephone visits are billed at different rates depending on your insurance coverage. During this emergency period, for some insurers they may be billed the same as an in-person visit.  Please reach out to your insurance provider with any questions.    If during the course of the call the physician/provider feels a telephone visit is not appropriate, you will not be charged for this service.\"    Patient has given verbal consent for Telephone visit?  Yes    What phone number would you like to be contacted at? [phone]    How would you like to obtain your AVS? Reviewed verbally      Recent issues:  Diabetes followup  Had lumbar spine surgery 4/2020, feeling better but has \"floppy left foot\"           Diagnosis of diabetes mellitus in his 50's  Recalls high glucose levels during medical evaluation with Dr. Alexander Cortez/[facility]  Initial treatment with metformin medication  Has also use glyburide and Lantus insulin medications in the past  Had seen me for diabetes evaluations years ago  2012- 2018. Moved to [place], [place]  Recalls participating with " diabetes study while in [place]              Tried a different type of insulin              Recalls having a hgbA1c near 5.8%  Has continued on metformin and Toujeo insulin medications     Previous [facility] labs include:           Previous [facility] hgbA1c levels include:             Lab Test 04/11/12  0929 10/21/11  1100 04/23/11  1940 03/04/11  0731   A1C 8.7* 8.3* 7.7* 7.6*      Current DM medications:  Metformin 1000 mg     Morning and evening  Toujeo 22 units Subcutaneous each morning     Using a BG meter (name?)               Testing 2x/day              Recent FBG  am and 157-232 pm     Exercises with step class or weights at [facility] 3x/week, also some golf  Recent [facility] labs include:  Lab Results   Component Value Date    A1C 6.9 (H) 05/13/2019     (A) 01/07/2020    POTASSIUM 4.2 01/07/2020    CHLORIDE 104 01/07/2020    CO2 20 (A) 01/07/2020    ANIONGAP 9 01/07/2020     (A) 01/07/2020    BUN 22 01/07/2020    CR 1.95 (A) 01/07/2020    GFRESTIMATED 41 01/07/2020    GFRESTBLACK 63 05/13/2019    SISI 8.6 01/07/2020    CHOL 141 12/04/2019    TRIG 175 12/04/2019    HDL 40 12/04/2019    LDL 66 12/04/2019    UCRR 227 05/13/2019    MICROL 25 05/13/2019    UMALCR 10.93 05/13/2019     Last eye exam 1/2019?, no DR  DM Complications:              Macrovascular disease                          Recalls having MI, though details not available                          CAD, had 2-coronary stents in 2009 and also 5/2015                   , lives in [place]  Sees Dr. Bon Su/[facility] for general medicine evaluations.        ROS: 10 point ROS neg other than the symptoms noted above in the HPI.     GENERAL: mild fatigue, wt stable; denies fevers, chills, night sweats.   HEENT: nasal symptoms as noted; no dysphagia, odonophagia, diplopia, neck pain  THYROID:  no apparent hyper or hypothyroid symptoms  CV: no chest pain, pressure, palpitations  LUNGS: no SOB, SINGH, cough, wheezing   ABDOMEN: no diarrhea,  constipation, abdominal pain  EXTREMITIES: no rashes, ulcers, edema  NEUROLOGY: no headaches, denies changes in vision, tingling, extremitiy numbness   MSK: left knee pains; denies muscle weakness  SKIN: no rashes or lesions  : occasional nocturia  PSYCH:  stable mood, no significant anxiety or depression  ENDOCRINE: no heat or cold intolerance        LABS:     All pertinent notes, labs, and images personally reviewed by me.      A/P:       Encounter Diagnosis   Name      Type 2 diabetes mellitus with other circulatory complication, with long-term current use of insulin (H)         Comments:  Reviewed health history and diabetes issues.  Recent glucose control good overall  Reviewed recent [facility] lab results     Plan:  Discussed general issues with the diabetes diagnosis and management  We discussed the hgbA1c test which reflects previous overall glucose levels or control  Discussed the importance of blood glucose (BG) testing to assess glucose trends  Provided general overview of the diabetes medication options and medication treatment plan.     Recommend:  Continue current metformin and Toujeo medications, as noted  We have discussed idea of changing from the basal insulin to a GLP1RA medication such as Victoza, Ozempic, or Trulicity              Would be helpful to check with [facility] physician to learn if we can prescribe VA-approved meds  Goal target FBG  mg/dl, hgbA1c <8%  No labs ordered at this time  Continue current hypertension and atorvastatin medications, per PCP  Keep focus on diet, exercise, weight management.  He states he has adequate refills on his DM meds (per PCP?) at this time  Advise having fasting lipid panel testing and dilated eye examination, at least annually  F/u evaluation in 6 mo     Addressed patient questions today       Total time of call between patient and provider was 16 minutes     This telephone visit chart note is the equivalent of a 34682 office visit billing code      T.W.  Maia MCKENZIE, MS  Endocrinology  [facility]        Again, thank you for allowing me to participate in the care of your patient.        Sincerely,        Adriano Carvalho MD

## 2020-07-07 NOTE — PROGRESS NOTES
"Hilario Herrera is a 79 year old male who is being evaluated via a billable telephone visit.      The patient has been notified of following:     \"This telephone visit will be conducted via a call between you and your physician/provider. We have found that certain health care needs can be provided without the need for a physical exam.  This service lets us provide the care you need with a short phone conversation.  If a prescription is necessary we can send it directly to your pharmacy.  If lab work is needed we can place an order for that and you can then stop by our lab to have the test done at a later time.    Telephone visits are billed at different rates depending on your insurance coverage. During this emergency period, for some insurers they may be billed the same as an in-person visit.  Please reach out to your insurance provider with any questions.    If during the course of the call the physician/provider feels a telephone visit is not appropriate, you will not be charged for this service.\"    Patient has given verbal consent for Telephone visit?  Yes    What phone number would you like to be contacted at? [phone]    How would you like to obtain your AVS? Reviewed verbally      Recent issues:  Diabetes followup  Had lumbar spine surgery 4/2020, feeling better but has \"floppy left foot\"           Diagnosis of diabetes mellitus in his 50's  Recalls high glucose levels during medical evaluation with Dr. Alexander Cortez/[facility]  Initial treatment with metformin medication  Has also use glyburide and Lantus insulin medications in the past  Had seen me for diabetes evaluations years ago  2012- 2018. Moved to [place], [place]  Recalls participating with diabetes study while in [place]              Tried a different type of insulin              Recalls having a hgbA1c near 5.8%  Has continued on metformin and Toujeo insulin medications     Previous [facility] labs include:           Previous [facility] " hgbA1c levels include:             Lab Test 04/11/12  0929 10/21/11  1100 04/23/11  1940 03/04/11  0731   A1C 8.7* 8.3* 7.7* 7.6*      Current DM medications:  Metformin 1000 mg     Morning and evening  Toujeo 22 units Subcutaneous each morning     Using a BG meter (name?)               Testing 2x/day              Recent FBG  am and 157-232 pm     Exercises with step class or weights at [facility] 3x/week, also some golf  Recent [facility] labs include:  Lab Results   Component Value Date    A1C 6.9 (H) 05/13/2019     (A) 01/07/2020    POTASSIUM 4.2 01/07/2020    CHLORIDE 104 01/07/2020    CO2 20 (A) 01/07/2020    ANIONGAP 9 01/07/2020     (A) 01/07/2020    BUN 22 01/07/2020    CR 1.95 (A) 01/07/2020    GFRESTIMATED 41 01/07/2020    GFRESTBLACK 63 05/13/2019    SISI 8.6 01/07/2020    CHOL 141 12/04/2019    TRIG 175 12/04/2019    HDL 40 12/04/2019    LDL 66 12/04/2019    UCRR 227 05/13/2019    MICROL 25 05/13/2019    UMALCR 10.93 05/13/2019     Last eye exam 1/2019?, no DR  DM Complications:              Macrovascular disease                          Recalls having MI, though details not available                          CAD, had 2-coronary stents in 2009 and also 5/2015                   , lives in [place]  Sees Dr. Bon Su/[facility] for general medicine evaluations.        ROS: 10 point ROS neg other than the symptoms noted above in the HPI.     GENERAL: mild fatigue, wt stable; denies fevers, chills, night sweats.   HEENT: nasal symptoms as noted; no dysphagia, odonophagia, diplopia, neck pain  THYROID:  no apparent hyper or hypothyroid symptoms  CV: no chest pain, pressure, palpitations  LUNGS: no SOB, SINGH, cough, wheezing   ABDOMEN: no diarrhea, constipation, abdominal pain  EXTREMITIES: no rashes, ulcers, edema  NEUROLOGY: no headaches, denies changes in vision, tingling, extremitiy numbness   MSK: left knee pains; denies muscle weakness  SKIN: no rashes or lesions  : occasional  nocturia  PSYCH:  stable mood, no significant anxiety or depression  ENDOCRINE: no heat or cold intolerance        LABS:     All pertinent notes, labs, and images personally reviewed by me.      A/P:       Encounter Diagnosis   Name      Type 2 diabetes mellitus with other circulatory complication, with long-term current use of insulin (H)         Comments:  Reviewed health history and diabetes issues.  Recent glucose control good overall  Reviewed recent [facility] lab results     Plan:  Discussed general issues with the diabetes diagnosis and management  We discussed the hgbA1c test which reflects previous overall glucose levels or control  Discussed the importance of blood glucose (BG) testing to assess glucose trends  Provided general overview of the diabetes medication options and medication treatment plan.     Recommend:  Continue current metformin and Toujeo medications, as noted  We have discussed idea of changing from the basal insulin to a GLP1RA medication such as Victoza, Ozempic, or Trulicity              Would be helpful to check with [facility] physician to learn if we can prescribe VA-approved meds  Goal target FBG  mg/dl, hgbA1c <8%  No labs ordered at this time  Continue current hypertension and atorvastatin medications, per PCP  Keep focus on diet, exercise, weight management.  He states he has adequate refills on his DM meds (per PCP?) at this time  Advise having fasting lipid panel testing and dilated eye examination, at least annually  F/u evaluation in 6 mo     Addressed patient questions today       Total time of call between patient and provider was 16 minutes     This telephone visit chart note is the equivalent of a 17841 office visit billing code      ROBERTO CARLOS Carvalho MD, MS  Endocrinology  [facility]

## 2020-08-17 LAB
ALT SERPL-CCNC: 33 U/L
ANION GAP SERPL CALCULATED.3IONS-SCNC: 10 MMOL/L
BUN SERPL-MCNC: 15 MG/DL
CALCIUM SERPL-MCNC: 9 MG/DL
CHLORIDE SERPLBLD-SCNC: 107 MMOL/L
CHOLEST SERPL-MCNC: 119 MG/DL
CO2 SERPL-SCNC: 22 MMOL/L
CREAT SERPL-MCNC: 1.18 MG/DL
GFR SERPL CREATININE-BSD FRML MDRD: >60 ML/MIN/1.73M2
GLUCOSE SERPL-MCNC: 145 MG/DL (ref 65–100)
HDLC SERPL-MCNC: 39 MG/DL
LDLC SERPL CALC-MCNC: 60 MG/DL
NONHDLC SERPL-MCNC: NORMAL MG/DL
POTASSIUM SERPL-SCNC: 4.3 MMOL/L
SODIUM SERPL-SCNC: 139 MMOL/L
TRIGL SERPL-MCNC: 102 MG/DL

## 2020-10-28 ENCOUNTER — PRE VISIT (OUTPATIENT)
Dept: CARDIOLOGY | Facility: CLINIC | Age: 80
End: 2020-10-28

## 2020-10-28 NOTE — TELEPHONE ENCOUNTER
Attempted to find 2015 angiogram done in [place], [place]. Faxed request to [facility], [phone], [phone]

## 2020-11-17 ENCOUNTER — OFFICE VISIT (OUTPATIENT)
Dept: CARDIOLOGY | Facility: CLINIC | Age: 80
End: 2020-11-17
Payer: MEDICARE

## 2020-11-17 VITALS
HEART RATE: 58 BPM | BODY MASS INDEX: 27.03 KG/M2 | WEIGHT: 210.6 LBS | DIASTOLIC BLOOD PRESSURE: 77 MMHG | SYSTOLIC BLOOD PRESSURE: 136 MMHG | HEIGHT: 74 IN

## 2020-11-17 DIAGNOSIS — I25.10 CORONARY ARTERY DISEASE INVOLVING NATIVE CORONARY ARTERY OF NATIVE HEART WITHOUT ANGINA PECTORIS: Primary | ICD-10-CM

## 2020-11-17 DIAGNOSIS — E78.5 HYPERLIPIDEMIA LDL GOAL <70: ICD-10-CM

## 2020-11-17 DIAGNOSIS — I10 ESSENTIAL HYPERTENSION, BENIGN: ICD-10-CM

## 2020-11-17 PROCEDURE — 99214 OFFICE O/P EST MOD 30 MIN: CPT | Performed by: PHYSICIAN ASSISTANT

## 2020-11-17 ASSESSMENT — MIFFLIN-ST. JEOR: SCORE: 1750.03

## 2020-11-17 NOTE — PROGRESS NOTES
Primary Cardiologist: Dr. Wright (will need to establish with new cardiologist in the future)    Reason for Visit: Annual Follow up    History of Present Illness:   This is a pleasant 79-year-old male with past medical history notable for CAD (history of stent placement to LAD back in 2009 with repeat coronary angiogram in 2011 showing patent stent and no other new lesions), hypertension, hyperlipidemia (on atorvastatin 10 mg with LDL of 60), and type 2 diabetes mellitus (insulin-dependent; most recent hemoglobin A1c 6.4%).    He reports feeling well though he does miss exercising regularly.  He denies any specific symptoms such as chest discomfort, shortness of breath, palpitations, lightheadedness, or bleeding issues.  He did have some peripheral edema in the past but this has resolved on its own.  He denies any concerns with his current medications.    Assessment and Plan:  This is a pleasant 79-year-old male with past medical history notable for CAD (history of stent placement to LAD back in 2009 with repeat coronary angiogram in 2011 showing patent stent and no other new lesions), hypertension, hyperlipidemia (on atorvastatin 10 mg with LDL of 60), and type 2 diabetes mellitus (insulin-dependent; most recent hemoglobin A1c 6.4%).    He appears to be doing well from a cardiac standpoint.  He has no symptoms to suggest ischemia, heart failure, or arrhythmia.  We will continue with current medications without any changes.  His most recent labs were completed several months ago and they showed good control of his cholesterol numbers, normal renal function, and normal electrolytes.  His hemoglobin A1c also improved.  His blood pressure and heart rate are within normal limits today.  We will see him back in 1 year.  He has previously seen Dr. Wright but will need to establish care with a new cardiologist as Dr. Wright is retiring in the next few months.      This note was completed in part using Dragon voice  recognition software. Although reviewed after completion, some word and grammatical errors may occur.    Orders this Visit:  No orders of the defined types were placed in this encounter.    No orders of the defined types were placed in this encounter.    Medications Discontinued During This Encounter   Medication Reason     blood glucose (ACCU-CHEK BIANCA PLUS) test strip Medication Reconciliation Clean Up     blood glucose (NO BRAND SPECIFIED) test strip Medication Reconciliation Clean Up         Encounter Diagnoses   Name Primary?     Hyperlipidemia LDL goal <70 Yes     S/P coronary artery stent placement      Essential hypertension, benign      ASCVD (arteriosclerotic cardiovascular disease)        CURRENT MEDICATIONS:  Current Outpatient Medications   Medication Sig Dispense Refill     amLODIPine (NORVASC) 5 MG tablet Take 5 mg by mouth daily       aspirin (ASA) 81 MG chewable tablet Take 81 mg by mouth daily       atorvastatin (LIPITOR) 10 MG tablet Take 10 mg by mouth daily       carvedilol (COREG) 3.125 MG tablet Take 3.125 mg by mouth 2 times daily       insulin glargine (LANTUS SOLOSTAR) 100 UNIT/ML pen Inject 22 Units Subcutaneous At Bedtime        INSULIN PEN NEEDLE MISC Insulin pen needle caps- in injection daily 100 Each 3     losartan (COZAAR) 50 MG tablet Take 50 mg by mouth daily       magnesium oxide (MAG-OX) 400 MG tablet Take 400 mg by mouth daily       metFORMIN (GLUCOPHAGE) 1000 MG tablet Take 1 tablet by mouth 2 times daily (with meals). 180 tablet 0     nitroGLYCERIN (NITROSTAT) 0.4 MG SL tablet Take 1 tablet by mouth every 5 minutes as needed. up to 3 tablets per episode. 90 tablet 0     Omega-3 Fatty Acids (FISH OIL PO)        omeprazole 20 MG tablet Take 20 mg by mouth as needed        BD ULTRA FINE PEN NEEDLES 31g 5 mm using injecting once per day 100 Units 1       ALLERGIES     Allergies   Allergen Reactions     Lisinopril      Other reaction(s): Angioedema, swelling of lips         PAST  MEDICAL HISTORY:  Past Medical History:   Diagnosis Date     Coronary artery disease involving native coronary artery of native heart without angina pectoris     NSTMI; PTCA of LAD , 2nd stent in [place]      Degeneration of lumbar or lumbosacral intervertebral disc      Depression      Essential hypertension, benign      Gout      Other and unspecified hyperlipidemia      Thoracic or lumbosacral neuritis or radiculitis, unspecified      Type II or unspecified type diabetes mellitus without mention of complication, not stated as uncontrolled        PAST SURGICAL HISTORY:  Past Surgical History:   Procedure Laterality Date     ANGIOPLASTY  09    PTCA of LAD at [facility]     APPENDECTOMY  2003    abscessed appendix     C INJECT EPIDURAL ANEST,LUMBAR,SINGLE  07    Right L5-S1 translaminar epidural under fluoroscopy with corticosteroid     C LUMBAR SPINE FUSN,POST TECH  08    Bilateral L4-5 decompression, Posterolateral fusion L4-5 with pedicular fixation and local autograft     HC COLONOSCOPY THRU STOMA, DIAGNOSTIC      no polyps, no diverticuli     HC COLONOSCOPY THRU STOMA, DIAGNOSTIC  07    Normal colonoscopy at [facility]     HC REPAIR ING HERNIA,5+Y/O,REDUCIBL  1960's    bilateral inguinal hernia repair     LAPAROSCOPIC HERNIORRHAPHY VENTRAL  11/12/10    Laparoscopic ventral hernia repair with Proceed patch       FAMILY HISTORY:  Family History   Problem Relation Age of Onset     C.A.D. Father      Diabetes Father      Family History Negative Mother      Blood Disease Sister          acute leukemia age 56     Family History Negative Sister      Family History Negative Sister        SOCIAL HISTORY:  Social History     Socioeconomic History     Marital status:      Spouse name: None     Number of children: None     Years of education: None     Highest education level: None   Occupational History     None   Social Needs     Financial resource strain: None     Food  "insecurity     Worry: None     Inability: None     Transportation needs     Medical: None     Non-medical: None   Tobacco Use     Smoking status: Former Smoker     Types: Cigars     Smokeless tobacco: Former User     Quit date: 4/13/2012     Tobacco comment: former 3 cigars per week (socially)   Substance and Sexual Activity     Alcohol use: Yes     Comment: occasionally     Drug use: No     Sexual activity: Yes     Partners: Female   Lifestyle     Physical activity     Days per week: None     Minutes per session: None     Stress: None   Relationships     Social connections     Talks on phone: None     Gets together: None     Attends Protestant service: None     Active member of club or organization: None     Attends meetings of clubs or organizations: None     Relationship status: None     Intimate partner violence     Fear of current or ex partner: None     Emotionally abused: None     Physically abused: None     Forced sexual activity: None   Other Topics Concern     Parent/sibling w/ CABG, MI or angioplasty before 65F 55M? Not Asked   Social History Narrative     None       Review of Systems:  Skin:  Negative     Eyes:  Negative    ENT:  Negative    Respiratory:  Negative    Cardiovascular:    Positive for;edema  Gastroenterology: Negative    Genitourinary:  not assessed    Musculoskeletal:  Negative    Neurologic:  Negative    Psychiatric:  Negative    Heme/Lymph/Imm:  Negative    Endocrine:  Positive for diabetes    Physical Exam:  Vitals: /77   Pulse 58   Ht 1.88 m (6' 2\")   Wt 95.5 kg (210 lb 9.6 oz)   BMI 27.04 kg/m       GEN:  NAD  NECK: No JVD  C/V:  Regular rate and rhythm, no murmur, rub or gallop.  RESP: Clear to auscultation bilaterally without wheezing, rales, or rhonchi.  GI: Abdomen soft, nontender, nondistended. No HSM appreciated.   EXTREM: Trace pitting LE edema.   NEURO: Alert and oriented, cooperative. No obvious focal deficits.   PSYCH: Normal affect.  SKIN: Warm and dry. "       Recent Lab Results:  LIPID RESULTS:  Lab Results   Component Value Date    CHOL 119 08/17/2020    HDL 39 08/17/2020    LDL 60 08/17/2020    TRIG 102 08/17/2020    CHOLHDLRATIO 3.6 04/11/2012       LIVER ENZYME RESULTS:  Lab Results   Component Value Date    AST 25 01/07/2020    ALT 33 08/17/2020       CBC RESULTS:  Lab Results   Component Value Date    WBC 8.0 05/21/2020    RBC 4.66 05/21/2020    HGB 14.2 05/21/2020    HCT 41.3 05/21/2020    MCV 89 05/21/2020    MCH 30.5 05/21/2020    MCHC 34.4 05/21/2020    RDW 13.8 05/21/2020     05/21/2020       BMP RESULTS:  Lab Results   Component Value Date     08/17/2020    POTASSIUM 4.3 08/17/2020    CHLORIDE 107 08/17/2020    CO2 22 08/17/2020    ANIONGAP 10 08/17/2020     (A) 08/17/2020    BUN 15 08/17/2020    CR 1.18 08/17/2020    GFRESTIMATED >60 08/17/2020    GFRESTBLACK 63 05/13/2019    SISI 9.0 08/17/2020        A1C RESULTS:  Lab Results   Component Value Date    A1C 6.9 (H) 05/13/2019       INR RESULTS:  Lab Results   Component Value Date    INR 1.07 04/25/2011    INR 1.02 05/10/2009           Shantanu Villasenor PA-C  November 17, 2020

## 2020-11-17 NOTE — PATIENT INSTRUCTIONS
Today's Plan:   1) No new changes. Return in 1 year.     If you have questions or concerns please call my nurse team at [phone].     Scheduling phone number: [phone]  Reminder: Please bring in all current medications, over the counter supplements and vitamin bottles to your next appointment.    It was a pleasure seeing you today!     Shantanu Villasenor PA-C  11/17/2020

## 2020-11-17 NOTE — LETTER
11/17/2020    Bon Su MD  [facility] [address]  [facility] [address]    RE: Hilario Herrera       Dear Colleague,    I had the pleasure of seeing Hilario Herrera in the [facility].    Primary Cardiologist: Dr. Wright (will need to establish with new cardiologist in the future)    Reason for Visit: Annual Follow up    History of Present Illness:   This is a pleasant 79-year-old male with past medical history notable for CAD (history of stent placement to LAD back in 2009 with repeat coronary angiogram in 2011 showing patent stent and no other new lesions), hypertension, hyperlipidemia (on atorvastatin 10 mg with LDL of 60), and type 2 diabetes mellitus (insulin-dependent; most recent hemoglobin A1c 6.4%).    He reports feeling well though he does miss exercising regularly.  He denies any specific symptoms such as chest discomfort, shortness of breath, palpitations, lightheadedness, or bleeding issues.  He did have some peripheral edema in the past but this has resolved on its own.  He denies any concerns with his current medications.    Assessment and Plan:  This is a pleasant 79-year-old male with past medical history notable for CAD (history of stent placement to LAD back in 2009 with repeat coronary angiogram in 2011 showing patent stent and no other new lesions), hypertension, hyperlipidemia (on atorvastatin 10 mg with LDL of 60), and type 2 diabetes mellitus (insulin-dependent; most recent hemoglobin A1c 6.4%).    He appears to be doing well from a cardiac standpoint.  He has no symptoms to suggest ischemia, heart failure, or arrhythmia.  We will continue with current medications without any changes.  His most recent labs were completed several months ago and they showed good control of his cholesterol numbers, normal renal function, and normal electrolytes.  His hemoglobin A1c also improved.  His blood pressure and heart rate are within normal limits  today.  We will see him back in 1 year.  He has previously seen Dr. Wright but will need to establish care with a new cardiologist as Dr. Wright is retiring in the next few months.      This note was completed in part using Dragon voice recognition software. Although reviewed after completion, some word and grammatical errors may occur.    Orders this Visit:  No orders of the defined types were placed in this encounter.    No orders of the defined types were placed in this encounter.    Medications Discontinued During This Encounter   Medication Reason     blood glucose (ACCU-CHEK BIANCA PLUS) test strip Medication Reconciliation Clean Up     blood glucose (NO BRAND SPECIFIED) test strip Medication Reconciliation Clean Up         Encounter Diagnoses   Name Primary?     Hyperlipidemia LDL goal <70 Yes     S/P coronary artery stent placement      Essential hypertension, benign      ASCVD (arteriosclerotic cardiovascular disease)        CURRENT MEDICATIONS:  Current Outpatient Medications   Medication Sig Dispense Refill     amLODIPine (NORVASC) 5 MG tablet Take 5 mg by mouth daily       aspirin (ASA) 81 MG chewable tablet Take 81 mg by mouth daily       atorvastatin (LIPITOR) 10 MG tablet Take 10 mg by mouth daily       carvedilol (COREG) 3.125 MG tablet Take 3.125 mg by mouth 2 times daily       insulin glargine (LANTUS SOLOSTAR) 100 UNIT/ML pen Inject 22 Units Subcutaneous At Bedtime        INSULIN PEN NEEDLE MISC Insulin pen needle caps- in injection daily 100 Each 3     losartan (COZAAR) 50 MG tablet Take 50 mg by mouth daily       magnesium oxide (MAG-OX) 400 MG tablet Take 400 mg by mouth daily       metFORMIN (GLUCOPHAGE) 1000 MG tablet Take 1 tablet by mouth 2 times daily (with meals). 180 tablet 0     nitroGLYCERIN (NITROSTAT) 0.4 MG SL tablet Take 1 tablet by mouth every 5 minutes as needed. up to 3 tablets per episode. 90 tablet 0     Omega-3 Fatty Acids (FISH OIL PO)        omeprazole 20 MG tablet Take 20  mg by mouth as needed        BD ULTRA FINE PEN NEEDLES 31g 5 mm using injecting once per day 100 Units 1       ALLERGIES     Allergies   Allergen Reactions     Lisinopril      Other reaction(s): Angioedema, swelling of lips         PAST MEDICAL HISTORY:  Past Medical History:   Diagnosis Date     Coronary artery disease involving native coronary artery of native heart without angina pectoris     NSTMI; PTCA of LAD , 2nd stent in [place]      Degeneration of lumbar or lumbosacral intervertebral disc      Depression      Essential hypertension, benign      Gout      Other and unspecified hyperlipidemia      Thoracic or lumbosacral neuritis or radiculitis, unspecified      Type II or unspecified type diabetes mellitus without mention of complication, not stated as uncontrolled        PAST SURGICAL HISTORY:  Past Surgical History:   Procedure Laterality Date     ANGIOPLASTY  09    PTCA of LAD at [facility]     APPENDECTOMY      abscessed appendix     C INJECT EPIDURAL ANEST,LUMBAR,SINGLE  07    Right L5-S1 translaminar epidural under fluoroscopy with corticosteroid     C LUMBAR SPINE FUSN,POST TECH  08    Bilateral L4-5 decompression, Posterolateral fusion L4-5 with pedicular fixation and local autograft     HC COLONOSCOPY THRU STOMA, DIAGNOSTIC      no polyps, no diverticuli     HC COLONOSCOPY THRU STOMA, DIAGNOSTIC  07    Normal colonoscopy at [facility]     HC REPAIR ING HERNIA,5+Y/O,REDUCIBL  's    bilateral inguinal hernia repair     LAPAROSCOPIC HERNIORRHAPHY VENTRAL  11/12/10    Laparoscopic ventral hernia repair with Proceed patch       FAMILY HISTORY:  Family History   Problem Relation Age of Onset     C.A.D. Father      Diabetes Father      Family History Negative Mother      Blood Disease Sister          acute leukemia age 56     Family History Negative Sister      Family History Negative Sister        SOCIAL HISTORY:  Social History     Socioeconomic History      "Marital status:      Spouse name: None     Number of children: None     Years of education: None     Highest education level: None   Occupational History     None   Social Needs     Financial resource strain: None     Food insecurity     Worry: None     Inability: None     Transportation needs     Medical: None     Non-medical: None   Tobacco Use     Smoking status: Former Smoker     Types: Cigars     Smokeless tobacco: Former User     Quit date: 4/13/2012     Tobacco comment: former 3 cigars per week (socially)   Substance and Sexual Activity     Alcohol use: Yes     Comment: occasionally     Drug use: No     Sexual activity: Yes     Partners: Female   Lifestyle     Physical activity     Days per week: None     Minutes per session: None     Stress: None   Relationships     Social connections     Talks on phone: None     Gets together: None     Attends Cheondoism service: None     Active member of club or organization: None     Attends meetings of clubs or organizations: None     Relationship status: None     Intimate partner violence     Fear of current or ex partner: None     Emotionally abused: None     Physically abused: None     Forced sexual activity: None   Other Topics Concern     Parent/sibling w/ CABG, MI or angioplasty before 65F 55M? Not Asked   Social History Narrative     None       Review of Systems:  Skin:  Negative     Eyes:  Negative    ENT:  Negative    Respiratory:  Negative    Cardiovascular:    Positive for;edema  Gastroenterology: Negative    Genitourinary:  not assessed    Musculoskeletal:  Negative    Neurologic:  Negative    Psychiatric:  Negative    Heme/Lymph/Imm:  Negative    Endocrine:  Positive for diabetes    Physical Exam:  Vitals: /77   Pulse 58   Ht 1.88 m (6' 2\")   Wt 95.5 kg (210 lb 9.6 oz)   BMI 27.04 kg/m       GEN:  NAD  NECK: No JVD  C/V:  Regular rate and rhythm, no murmur, rub or gallop.  RESP: Clear to auscultation bilaterally without wheezing, rales, or " rhonchi.  GI: Abdomen soft, nontender, nondistended. No HSM appreciated.   EXTREM: Trace pitting LE edema.   NEURO: Alert and oriented, cooperative. No obvious focal deficits.   PSYCH: Normal affect.  SKIN: Warm and dry.       Recent Lab Results:  LIPID RESULTS:  Lab Results   Component Value Date    CHOL 119 08/17/2020    HDL 39 08/17/2020    LDL 60 08/17/2020    TRIG 102 08/17/2020    CHOLHDLRATIO 3.6 04/11/2012       LIVER ENZYME RESULTS:  Lab Results   Component Value Date    AST 25 01/07/2020    ALT 33 08/17/2020       CBC RESULTS:  Lab Results   Component Value Date    WBC 8.0 05/21/2020    RBC 4.66 05/21/2020    HGB 14.2 05/21/2020    HCT 41.3 05/21/2020    MCV 89 05/21/2020    MCH 30.5 05/21/2020    MCHC 34.4 05/21/2020    RDW 13.8 05/21/2020     05/21/2020       BMP RESULTS:  Lab Results   Component Value Date     08/17/2020    POTASSIUM 4.3 08/17/2020    CHLORIDE 107 08/17/2020    CO2 22 08/17/2020    ANIONGAP 10 08/17/2020     (A) 08/17/2020    BUN 15 08/17/2020    CR 1.18 08/17/2020    GFRESTIMATED >60 08/17/2020    GFRESTBLACK 63 05/13/2019    SISI 9.0 08/17/2020        A1C RESULTS:  Lab Results   Component Value Date    A1C 6.9 (H) 05/13/2019       INR RESULTS:  Lab Results   Component Value Date    INR 1.07 04/25/2011    INR 1.02 05/10/2009       Shantanu Villasenor PA-C  November 17, 2020       Thank you for allowing me to participate in the care of your patient.    Sincerely,     Shantanu Villasenor PA-C     [facility]

## 2020-12-15 ENCOUNTER — VIRTUAL VISIT (OUTPATIENT)
Dept: ENDOCRINOLOGY | Facility: CLINIC | Age: 80
End: 2020-12-15
Payer: MEDICARE

## 2020-12-15 ENCOUNTER — MYC MEDICAL ADVICE (OUTPATIENT)
Dept: ENDOCRINOLOGY | Facility: CLINIC | Age: 80
End: 2020-12-15

## 2020-12-15 DIAGNOSIS — E11.59 TYPE 2 DIABETES MELLITUS WITH OTHER CIRCULATORY COMPLICATION, WITH LONG-TERM CURRENT USE OF INSULIN (H): Primary | ICD-10-CM

## 2020-12-15 DIAGNOSIS — Z79.4 TYPE 2 DIABETES MELLITUS WITH OTHER CIRCULATORY COMPLICATION, WITH LONG-TERM CURRENT USE OF INSULIN (H): Primary | ICD-10-CM

## 2020-12-15 PROCEDURE — 99442 PR PHYSICIAN TELEPHONE EVALUATION 11-20 MIN: CPT | Performed by: INTERNAL MEDICINE

## 2020-12-15 NOTE — PROGRESS NOTES
"Hilario Herrera is a 80 year old male who is being evaluated via a billable telephone visit.      The patient has been notified of following:     \"This telephone visit will be conducted via a call between you and your physician/provider. We have found that certain health care needs can be provided without the need for a physical exam.  This service lets us provide the care you need with a short phone conversation.  If a prescription is necessary we can send it directly to your pharmacy.  If lab work is needed we can place an order for that and you can then stop by our lab to have the test done at a later time.    Telephone visits are billed at different rates depending on your insurance coverage. During this emergency period, for some insurers they may be billed the same as an in-person visit.  Please reach out to your insurance provider with any questions.    If during the course of the call the physician/provider feels a telephone visit is not appropriate, you will not be charged for this service.\"    Patient has given verbal consent for Telephone visit?  Yes, since unable to do video connection    What phone number would you like to be contacted at? Cell phone    How would you like to obtain your AVS? Reviewed verbally      Recent issues:  Diabetes follow-up evaluation  Feeling pretty well overall  Previous lumbar spine surgery 4/2020, feeling more strength in his left foot now (and less \"flopping\")           Diagnosis of diabetes mellitus in his 50's  Recalls high glucose levels during medical evaluation with Dr. Alexander Cortez/[facility]  Initial treatment with metformin medication  Has also use glyburide and Lantus insulin medications in the past  Had seen me for diabetes evaluations years ago  2012- 2018. Moved to [place], [place]  Recalls participating with diabetes study while in [place]              Tried a different type of insulin              Recalls having a hgbA1c near 5.8%  Has continued " on metformin and Toujeo insulin medications     Previous [facility] labs include:         Previous [facility] hgbA1c levels include:                  Lab Test 04/11/12  0929 10/21/11  1100 04/23/11  1940 03/04/11  0731   A1C 8.7* 8.3* 7.7* 7.6*      Current DM medications:  Metformin 1000 mg     Morning and evening  Lantus 24 units            Subcutaneous each morning     Using a BG meter (name?)               Testing 2x/day              Recent FBG  mg/dl     Previous exercises with step class or weights at [employer] 3x/week, also some golf  Recent [facility] labs include:  Lab Results   Component Value Date    A1C 6.9 (H) 05/13/2019     08/17/2020    POTASSIUM 4.3 08/17/2020    CHLORIDE 107 08/17/2020    CO2 22 08/17/2020    ANIONGAP 10 08/17/2020     (A) 08/17/2020    BUN 15 08/17/2020    CR 1.18 08/17/2020    GFRESTIMATED >60 08/17/2020    GFRESTBLACK 63 05/13/2019    SISI 9.0 08/17/2020    CHOL 119 08/17/2020    TRIG 102 08/17/2020    HDL 39 08/17/2020    LDL 60 08/17/2020    UCRR 227 05/13/2019    MICROL 25 05/13/2019    UMALCR 10.93 05/13/2019     Last eye exam 1/2019?, no DR  DM Complications:              Macrovascular disease                          Recalls having MI, though details not available                          CAD, had 2-coronary stents in 2009 and also 5/2015                   , lives in [place]  Sees Dr. Bon Su/[facility] for general medicine evaluations.        ROS: 10 point ROS neg other than the symptoms noted above in the HPI.     GENERAL: mild fatigue, wt stable; denies fevers, chills, night sweats.   HEENT: nasal symptoms as noted; no dysphagia, odonophagia, diplopia, neck pain  THYROID:  no apparent hyper or hypothyroid symptoms  CV: no chest pain, pressure, palpitations  LUNGS: no SOB, SINGH, cough, wheezing   ABDOMEN: no diarrhea, constipation, abdominal pain  EXTREMITIES: no rashes, ulcers, edema  NEUROLOGY: no headaches, denies changes in vision, tingling, extremitiy  numbness   MSK: left knee pains; denies muscle weakness  SKIN: no rashes or lesions  : occasional nocturia  PSYCH:  stable mood, no significant anxiety or depression  ENDOCRINE: no heat or cold intolerance      LABS:     All pertinent notes, labs, and images personally reviewed by me.      A/P:          Encounter Diagnosis   Name       Type 2 diabetes mellitus with other circulatory complication, with long-term current use of insulin (H)           Comments:  Reviewed health history and diabetes issues.  Recent glucose control good overall  Reviewed recent [facility] lab results     Plan:  Discussed general issues with the diabetes diagnosis and management  We discussed the hgbA1c test which reflects previous overall glucose levels or control  Discussed the importance of blood glucose (BG) testing to assess glucose trends  Provided general overview of the diabetes medication options and medication treatment plan.     Recommend:  Continue current metformin and Lantus Solostar medications, as noted  We have discussed idea of changing from the basal insulin to a GLP1RA medication such as Victoza, Ozempic, or Trulicity              Would be helpful to check with [facility] physician to learn if we can prescribe [facility]-approved meds  Goal target FBG  mg/dl, hgbA1c <8%  Lab testing:   Plan future lab appt at our [facility] clinic   Lab orders placed  Continue current hypertension and atorvastatin medications, per PCP  Keep focus on diet, exercise, weight management.  Advise having fasting lipid panel testing and dilated eye examination, at least annually  F/u evaluation in 6 mo     Addressed patient questions today        Total time of call between patient and provider was 16 minutes      This telephone visit chart note is the equivalent of a 06123 office visit billing code        ROBERTO CARLOS Carvalho MD, MS  Endocrinology  [facility]

## 2020-12-17 DIAGNOSIS — E11.59 TYPE 2 DIABETES MELLITUS WITH OTHER CIRCULATORY COMPLICATION, WITH LONG-TERM CURRENT USE OF INSULIN (H): ICD-10-CM

## 2020-12-17 DIAGNOSIS — Z79.4 TYPE 2 DIABETES MELLITUS WITH OTHER CIRCULATORY COMPLICATION, WITH LONG-TERM CURRENT USE OF INSULIN (H): ICD-10-CM

## 2020-12-17 LAB
ANION GAP SERPL CALCULATED.3IONS-SCNC: 7 MMOL/L (ref 3–14)
BUN SERPL-MCNC: 21 MG/DL (ref 7–30)
CALCIUM SERPL-MCNC: 9.1 MG/DL (ref 8.5–10.1)
CHLORIDE SERPL-SCNC: 107 MMOL/L (ref 94–109)
CO2 SERPL-SCNC: 26 MMOL/L (ref 20–32)
CREAT SERPL-MCNC: 1.47 MG/DL (ref 0.66–1.25)
GFR SERPL CREATININE-BSD FRML MDRD: 45 ML/MIN/{1.73_M2}
GLUCOSE SERPL-MCNC: 210 MG/DL (ref 70–99)
HBA1C MFR BLD: 6.8 % (ref 0–5.6)
POTASSIUM SERPL-SCNC: 4.1 MMOL/L (ref 3.4–5.3)
SODIUM SERPL-SCNC: 140 MMOL/L (ref 133–144)

## 2020-12-17 PROCEDURE — 36415 COLL VENOUS BLD VENIPUNCTURE: CPT | Performed by: INTERNAL MEDICINE

## 2020-12-17 PROCEDURE — 80048 BASIC METABOLIC PNL TOTAL CA: CPT | Performed by: INTERNAL MEDICINE

## 2020-12-17 PROCEDURE — 83036 HEMOGLOBIN GLYCOSYLATED A1C: CPT | Performed by: INTERNAL MEDICINE

## 2021-01-15 ENCOUNTER — HEALTH MAINTENANCE LETTER (OUTPATIENT)
Age: 81
End: 2021-01-15

## 2021-02-02 ENCOUNTER — IMMUNIZATION (OUTPATIENT)
Dept: NURSING | Facility: CLINIC | Age: 81
End: 2021-02-02
Payer: MEDICARE

## 2021-02-02 PROCEDURE — 0001A PR COVID VAC PFIZER DIL RECON 30 MCG/0.3 ML IM: CPT

## 2021-02-02 PROCEDURE — 91300 PR COVID VAC PFIZER DIL RECON 30 MCG/0.3 ML IM: CPT

## 2021-02-23 ENCOUNTER — IMMUNIZATION (OUTPATIENT)
Dept: NURSING | Facility: CLINIC | Age: 81
End: 2021-02-23
Attending: EMERGENCY MEDICINE
Payer: MEDICARE

## 2021-02-23 PROCEDURE — 0002A PR COVID VAC PFIZER DIL RECON 30 MCG/0.3 ML IM: CPT

## 2021-02-23 PROCEDURE — 91300 PR COVID VAC PFIZER DIL RECON 30 MCG/0.3 ML IM: CPT

## 2021-06-09 ENCOUNTER — MYC MEDICAL ADVICE (OUTPATIENT)
Dept: ENDOCRINOLOGY | Facility: CLINIC | Age: 81
End: 2021-06-09

## 2021-07-10 ENCOUNTER — HEALTH MAINTENANCE LETTER (OUTPATIENT)
Age: 81
End: 2021-07-10

## 2021-09-04 ENCOUNTER — HEALTH MAINTENANCE LETTER (OUTPATIENT)
Age: 81
End: 2021-09-04

## 2021-12-02 ENCOUNTER — TELEPHONE (OUTPATIENT)
Dept: ENDOCRINOLOGY | Facility: CLINIC | Age: 81
End: 2021-12-02
Payer: MEDICARE

## 2021-12-02 NOTE — TELEPHONE ENCOUNTER
Spoke with Patient and asked him if he would like to come and  self monitoring book or if he would like me to mail out. Patient opted for mail. I am mailing out 2 self monitoring books today.    Josie Guillen MA       [facility]    Phone Message    May a detailed message be left on voicemail: yes     Reason for Call: Other: Per pt he is needing a new diabetes self-monitoring record book, and he stated that he has about a week left of his current book. Pt is wondering if there are any available. Please review, and call back to discuss.     Action Taken: Message routed to:  Other: endocrine    Travel Screening: Not Applicable

## 2022-02-19 ENCOUNTER — HEALTH MAINTENANCE LETTER (OUTPATIENT)
Age: 82
End: 2022-02-19

## 2022-10-16 ENCOUNTER — HEALTH MAINTENANCE LETTER (OUTPATIENT)
Age: 82
End: 2022-10-16

## 2023-06-01 ENCOUNTER — HEALTH MAINTENANCE LETTER (OUTPATIENT)
Age: 83
End: 2023-06-01

## 2023-11-04 ENCOUNTER — HEALTH MAINTENANCE LETTER (OUTPATIENT)
Age: 83
End: 2023-11-04

## 2024-01-13 ENCOUNTER — HEALTH MAINTENANCE LETTER (OUTPATIENT)
Age: 84
End: 2024-01-13